# Patient Record
Sex: FEMALE | Race: BLACK OR AFRICAN AMERICAN | Employment: FULL TIME | URBAN - METROPOLITAN AREA
[De-identification: names, ages, dates, MRNs, and addresses within clinical notes are randomized per-mention and may not be internally consistent; named-entity substitution may affect disease eponyms.]

---

## 2021-03-05 ENCOUNTER — HOSPITAL ENCOUNTER (OUTPATIENT)
Dept: PREADMISSION TESTING | Age: 36
Discharge: HOME | End: 2021-03-05
Payer: COMMERCIAL

## 2021-03-05 ENCOUNTER — ANESTHESIA EVENT (OUTPATIENT)
Dept: SURGERY | Age: 36
End: 2021-03-05
Payer: COMMERCIAL

## 2021-03-05 PROCEDURE — U0003 INFECTIOUS AGENT DETECTION BY NUCLEIC ACID (DNA OR RNA); SEVERE ACUTE RESPIRATORY SYNDROME CORONAVIRUS 2 (SARS-COV-2) (CORONAVIRUS DISEASE [COVID-19]), AMPLIFIED PROBE TECHNIQUE, MAKING USE OF HIGH THROUGHPUT TECHNOLOGIES AS DESCRIBED BY CMS-2020-01-R: HCPCS

## 2021-03-06 LAB — SARS-COV-2, COV2NT: NOT DETECTED

## 2021-03-08 ENCOUNTER — HOSPITAL ENCOUNTER (OUTPATIENT)
Age: 36
Discharge: HOME | End: 2021-03-08
Attending: STUDENT IN AN ORGANIZED HEALTH CARE EDUCATION/TRAINING PROGRAM | Admitting: STUDENT IN AN ORGANIZED HEALTH CARE EDUCATION/TRAINING PROGRAM
Payer: COMMERCIAL

## 2021-03-08 ENCOUNTER — ANESTHESIA (OUTPATIENT)
Dept: SURGERY | Age: 36
End: 2021-03-08
Payer: COMMERCIAL

## 2021-03-08 VITALS
TEMPERATURE: 98.2 F | BODY MASS INDEX: 36.52 KG/M2 | HEART RATE: 64 BPM | OXYGEN SATURATION: 100 % | HEIGHT: 70 IN | DIASTOLIC BLOOD PRESSURE: 71 MMHG | SYSTOLIC BLOOD PRESSURE: 129 MMHG | WEIGHT: 255.07 LBS | RESPIRATION RATE: 17 BRPM

## 2021-03-08 PROBLEM — O03.4 INCOMPLETE ABORTION: Status: ACTIVE | Noted: 2021-03-08

## 2021-03-08 PROCEDURE — 76210000046 HC AMBSU PH II REC FIRST 0.5 HR: Performed by: STUDENT IN AN ORGANIZED HEALTH CARE EDUCATION/TRAINING PROGRAM

## 2021-03-08 PROCEDURE — 76060000061 HC AMB SURG ANES 0.5 TO 1 HR: Performed by: STUDENT IN AN ORGANIZED HEALTH CARE EDUCATION/TRAINING PROGRAM

## 2021-03-08 PROCEDURE — 77030011210: Performed by: STUDENT IN AN ORGANIZED HEALTH CARE EDUCATION/TRAINING PROGRAM

## 2021-03-08 PROCEDURE — 76210000034 HC AMBSU PH I REC 0.5 TO 1 HR: Performed by: STUDENT IN AN ORGANIZED HEALTH CARE EDUCATION/TRAINING PROGRAM

## 2021-03-08 PROCEDURE — 77030019905 HC CATH URETH INTMIT MDII -A: Performed by: STUDENT IN AN ORGANIZED HEALTH CARE EDUCATION/TRAINING PROGRAM

## 2021-03-08 PROCEDURE — 88305 TISSUE EXAM BY PATHOLOGIST: CPT

## 2021-03-08 PROCEDURE — 74011000250 HC RX REV CODE- 250: Performed by: STUDENT IN AN ORGANIZED HEALTH CARE EDUCATION/TRAINING PROGRAM

## 2021-03-08 PROCEDURE — 76030000000 HC AMB SURG OR TIME 0.5 TO 1: Performed by: STUDENT IN AN ORGANIZED HEALTH CARE EDUCATION/TRAINING PROGRAM

## 2021-03-08 PROCEDURE — 77030003666 HC NDL SPINAL BD -A: Performed by: STUDENT IN AN ORGANIZED HEALTH CARE EDUCATION/TRAINING PROGRAM

## 2021-03-08 PROCEDURE — 74011250636 HC RX REV CODE- 250/636: Performed by: NURSE ANESTHETIST, CERTIFIED REGISTERED

## 2021-03-08 PROCEDURE — 77030008577 HC TBNG UTER SUC OCOA -A: Performed by: STUDENT IN AN ORGANIZED HEALTH CARE EDUCATION/TRAINING PROGRAM

## 2021-03-08 PROCEDURE — 74011000250 HC RX REV CODE- 250: Performed by: NURSE ANESTHETIST, CERTIFIED REGISTERED

## 2021-03-08 PROCEDURE — 77030020143 HC AIRWY LARYN INTUB CGAS -A: Performed by: ANESTHESIOLOGY

## 2021-03-08 PROCEDURE — 74011250636 HC RX REV CODE- 250/636: Performed by: ANESTHESIOLOGY

## 2021-03-08 PROCEDURE — 77030008578 HC TBNG UTER SUC BUSS -A: Performed by: STUDENT IN AN ORGANIZED HEALTH CARE EDUCATION/TRAINING PROGRAM

## 2021-03-08 PROCEDURE — 2709999900 HC NON-CHARGEABLE SUPPLY: Performed by: STUDENT IN AN ORGANIZED HEALTH CARE EDUCATION/TRAINING PROGRAM

## 2021-03-08 RX ORDER — GLYCOPYRROLATE 0.2 MG/ML
INJECTION INTRAMUSCULAR; INTRAVENOUS AS NEEDED
Status: DISCONTINUED | OUTPATIENT
Start: 2021-03-08 | End: 2021-03-08 | Stop reason: HOSPADM

## 2021-03-08 RX ORDER — BUPIVACAINE HYDROCHLORIDE 2.5 MG/ML
INJECTION, SOLUTION EPIDURAL; INFILTRATION; INTRACAUDAL AS NEEDED
Status: DISCONTINUED | OUTPATIENT
Start: 2021-03-08 | End: 2021-03-08 | Stop reason: HOSPADM

## 2021-03-08 RX ORDER — IBUPROFEN 800 MG/1
800 TABLET ORAL
Qty: 40 TAB | Refills: 0 | Status: SHIPPED | OUTPATIENT
Start: 2021-03-08

## 2021-03-08 RX ORDER — SODIUM CHLORIDE, SODIUM LACTATE, POTASSIUM CHLORIDE, CALCIUM CHLORIDE 600; 310; 30; 20 MG/100ML; MG/100ML; MG/100ML; MG/100ML
125 INJECTION, SOLUTION INTRAVENOUS CONTINUOUS
Status: DISCONTINUED | OUTPATIENT
Start: 2021-03-08 | End: 2021-03-08 | Stop reason: HOSPADM

## 2021-03-08 RX ORDER — SODIUM CHLORIDE 0.9 % (FLUSH) 0.9 %
5-40 SYRINGE (ML) INJECTION AS NEEDED
Status: DISCONTINUED | OUTPATIENT
Start: 2021-03-08 | End: 2021-03-08 | Stop reason: HOSPADM

## 2021-03-08 RX ORDER — KETOROLAC TROMETHAMINE 30 MG/ML
INJECTION, SOLUTION INTRAMUSCULAR; INTRAVENOUS AS NEEDED
Status: DISCONTINUED | OUTPATIENT
Start: 2021-03-08 | End: 2021-03-08 | Stop reason: HOSPADM

## 2021-03-08 RX ORDER — FLUMAZENIL 0.1 MG/ML
0.2 INJECTION INTRAVENOUS
Status: DISCONTINUED | OUTPATIENT
Start: 2021-03-08 | End: 2021-03-08 | Stop reason: HOSPADM

## 2021-03-08 RX ORDER — LIDOCAINE HYDROCHLORIDE 20 MG/ML
INJECTION, SOLUTION EPIDURAL; INFILTRATION; INTRACAUDAL; PERINEURAL AS NEEDED
Status: DISCONTINUED | OUTPATIENT
Start: 2021-03-08 | End: 2021-03-08 | Stop reason: HOSPADM

## 2021-03-08 RX ORDER — LIDOCAINE HYDROCHLORIDE 10 MG/ML
0.1 INJECTION, SOLUTION EPIDURAL; INFILTRATION; INTRACAUDAL; PERINEURAL AS NEEDED
Status: DISCONTINUED | OUTPATIENT
Start: 2021-03-08 | End: 2021-03-08 | Stop reason: HOSPADM

## 2021-03-08 RX ORDER — NALOXONE HYDROCHLORIDE 0.4 MG/ML
0.2 INJECTION, SOLUTION INTRAMUSCULAR; INTRAVENOUS; SUBCUTANEOUS
Status: DISCONTINUED | OUTPATIENT
Start: 2021-03-08 | End: 2021-03-08 | Stop reason: HOSPADM

## 2021-03-08 RX ORDER — ONDANSETRON 2 MG/ML
INJECTION INTRAMUSCULAR; INTRAVENOUS AS NEEDED
Status: DISCONTINUED | OUTPATIENT
Start: 2021-03-08 | End: 2021-03-08 | Stop reason: HOSPADM

## 2021-03-08 RX ORDER — HYDROMORPHONE HYDROCHLORIDE 1 MG/ML
.25-1 INJECTION, SOLUTION INTRAMUSCULAR; INTRAVENOUS; SUBCUTANEOUS
Status: DISCONTINUED | OUTPATIENT
Start: 2021-03-08 | End: 2021-03-08 | Stop reason: HOSPADM

## 2021-03-08 RX ORDER — PROPOFOL 10 MG/ML
INJECTION, EMULSION INTRAVENOUS AS NEEDED
Status: DISCONTINUED | OUTPATIENT
Start: 2021-03-08 | End: 2021-03-08 | Stop reason: HOSPADM

## 2021-03-08 RX ORDER — DIPHENHYDRAMINE HYDROCHLORIDE 50 MG/ML
12.5 INJECTION, SOLUTION INTRAMUSCULAR; INTRAVENOUS AS NEEDED
Status: DISCONTINUED | OUTPATIENT
Start: 2021-03-08 | End: 2021-03-08 | Stop reason: HOSPADM

## 2021-03-08 RX ORDER — SODIUM CHLORIDE 0.9 % (FLUSH) 0.9 %
5-40 SYRINGE (ML) INJECTION EVERY 8 HOURS
Status: DISCONTINUED | OUTPATIENT
Start: 2021-03-08 | End: 2021-03-08 | Stop reason: HOSPADM

## 2021-03-08 RX ORDER — DEXAMETHASONE SODIUM PHOSPHATE 4 MG/ML
INJECTION, SOLUTION INTRA-ARTICULAR; INTRALESIONAL; INTRAMUSCULAR; INTRAVENOUS; SOFT TISSUE AS NEEDED
Status: DISCONTINUED | OUTPATIENT
Start: 2021-03-08 | End: 2021-03-08 | Stop reason: HOSPADM

## 2021-03-08 RX ORDER — FENTANYL CITRATE 50 UG/ML
INJECTION, SOLUTION INTRAMUSCULAR; INTRAVENOUS AS NEEDED
Status: DISCONTINUED | OUTPATIENT
Start: 2021-03-08 | End: 2021-03-08 | Stop reason: HOSPADM

## 2021-03-08 RX ORDER — MIDAZOLAM HYDROCHLORIDE 1 MG/ML
INJECTION, SOLUTION INTRAMUSCULAR; INTRAVENOUS AS NEEDED
Status: DISCONTINUED | OUTPATIENT
Start: 2021-03-08 | End: 2021-03-08 | Stop reason: HOSPADM

## 2021-03-08 RX ADMIN — KETOROLAC TROMETHAMINE 30 MG: 30 INJECTION INTRAMUSCULAR; INTRAVENOUS at 13:23

## 2021-03-08 RX ADMIN — DEXAMETHASONE SODIUM PHOSPHATE 4 MG: 4 INJECTION, SOLUTION INTRAMUSCULAR; INTRAVENOUS at 13:12

## 2021-03-08 RX ADMIN — MIDAZOLAM HYDROCHLORIDE 4 MG: 2 INJECTION, SOLUTION INTRAMUSCULAR; INTRAVENOUS at 12:48

## 2021-03-08 RX ADMIN — FAMOTIDINE 20 MG: 10 INJECTION, SOLUTION INTRAVENOUS at 12:26

## 2021-03-08 RX ADMIN — FENTANYL CITRATE 50 MCG: 0.05 INJECTION, SOLUTION INTRAMUSCULAR; INTRAVENOUS at 12:57

## 2021-03-08 RX ADMIN — SODIUM CHLORIDE, POTASSIUM CHLORIDE, SODIUM LACTATE AND CALCIUM CHLORIDE: 600; 310; 30; 20 INJECTION, SOLUTION INTRAVENOUS at 12:30

## 2021-03-08 RX ADMIN — ONDANSETRON HYDROCHLORIDE 4 MG: 2 SOLUTION INTRAMUSCULAR; INTRAVENOUS at 13:23

## 2021-03-08 RX ADMIN — GLYCOPYRROLATE 0.2 MG: 0.2 INJECTION INTRAMUSCULAR; INTRAVENOUS at 13:15

## 2021-03-08 RX ADMIN — FENTANYL CITRATE 50 MCG: 0.05 INJECTION, SOLUTION INTRAMUSCULAR; INTRAVENOUS at 13:05

## 2021-03-08 RX ADMIN — LIDOCAINE HYDROCHLORIDE 80 MG: 20 INJECTION, SOLUTION INTRAVENOUS at 13:00

## 2021-03-08 RX ADMIN — PROPOFOL 200 MG: 10 INJECTION, EMULSION INTRAVENOUS at 13:00

## 2021-03-08 NOTE — ANESTHESIA POSTPROCEDURE EVALUATION
Procedure(s):  DILATATION AND CURETTAGE WITH SUCTION. general    Anesthesia Post Evaluation      Multimodal analgesia: multimodal analgesia not used between 6 hours prior to anesthesia start to PACU discharge  Patient location during evaluation: PACU  Patient participation: complete - patient participated  Level of consciousness: sleepy but conscious and responsive to verbal stimuli  Pain score: 0  Pain management: adequate  Airway patency: patent  Anesthetic complications: no  Cardiovascular status: hemodynamically stable and acceptable  Respiratory status: acceptable  Hydration status: acceptable  Comments: Patient seen and evaluated; no concerns. Post anesthesia nausea and vomiting:  none      INITIAL Post-op Vital signs:   Vitals Value Taken Time   /78 03/08/21 1425   Temp 36.8 °C (98.2 °F) 03/08/21 1415   Pulse 75 03/08/21 1426   Resp 14 03/08/21 1426   SpO2 100 % 03/08/21 1426   Vitals shown include unvalidated device data.

## 2021-03-08 NOTE — PERIOP NOTES
Discharge instructions reviewed with Agustín zavala Senters. Significant other made aware RX for ibuprofen placed in discharge folder. Opportunity for questions provided and answered.

## 2021-03-08 NOTE — OP NOTES
Name: Henri Gordon  MR#: [id number]  : 1985    DATE OF SERVICE: 3/8/2021     PREOPERATIVE DIAGNOSIS:  A 37year-old  2, para 1 with incomplete      POSTOPERATIVE DIAGNOSES:  Same      PROCEDURE  PERFORMED:  Dilation and curettage with suction. SURGEON:  Tiffanie Eisenberg DO.     ASSISTANT:  None. ANESTHESIA:  General with LMA and paracervical block. ESTIMATED BLOOD LOSS:  100cc     URINE OUTPUT:  150cc     IV FLUIDS:  500 ml of crystalloid     SPECIMENS REMOVED:  Products of conception. FINDINGS:  A 9-10 week size anteverted uterus, cervix closed. Tissue and clot consistent with products of conception evacuated from the uterus. COMPLICATIONS:  None. INDICATIONS:  This is a 37year-old  1, para 2 who presented to the office for evaluation of bleeding in early pregnancy  Was found to have a pregnancy of unknown location, suspected incomplete . She was counseled on options, and ultimately decided upon dilation and curettage to manage her miscarriage. Her blood type is A pos     DESCRIPTION OF THE PROCEDURE IN DETAIL:  The patient was taken to the operating room, positioned on the operating room table in supine position. After adequate anesthesia was achieved via general anesthesia with an LMA, she had her legs positioned in the [address] [place] [address]. Her perineum was prepped and she was draped in a sterile fashion. After a time-out was performed, a sterile speculum was inserted into the vagina. The cervix was visualized and was grasped with a ring forceps. A paracervical block was performed using 0.25% Marcaine, a total of 20 mL was injected. The cervix was then dilated up to a 9mm used Hegar dilators. An 9mm curved suction curette was then inserted and the uterine contents evacuated x2 passes, followed by sharp curettage which was done gently and a gritty texture was noted throughout. One additional pass with suction was performed.   At this point, all instruments were removed from the uterus, cervix and vagina. The patient had her legs taken down out of stirrups, and she was awoken from anesthesia and taken to the recovery room in stable condition. All counts were correct at the end of the case.         Raheem Foss, DO

## 2021-03-08 NOTE — DISCHARGE INSTRUCTIONS
Discharge Instructions for outpatient GYN surgery    Patient ID:  Ronnie Cordova  [id number]  30 y.o.  1985    Take Home Medications     Follow-up with Dr Romi Killian in 2 weeks, call office for appointment, [phone]    Follow-up care is a key part of your treatment and safety. Be sure to schedule and keep all recommended follow up appointments    What to Expect at [facility] might feel a bit sleepy, groggy or nauseous today because of the anesthetic or pain medication you received. Do not drive today. These symptoms should resolve by tomorrow. You will probably feel some cramping over the next day or two- this is normal.  You may take an over-the-counter pain medication such as Tylenol, Aleve, Motrin, Advil (ibuprofen) or you may have been given a prescription pain medication. Try to limit use of prescription pain medication to just a day or two, as they can cause constipation. You will probably experience some light vaginal bleeding or spotting. This is normal.     How can you care for yourself at home? Activity  Pelvic rest for 2-4 weeks (nothing in your vagina such as tampons, intercourse or douching)  You man return to work and normal activities tomorrow or the next day. If you are feeling well, you can also resume exercise. If you are having pain or not feeling well, you should wait a few days before exercising. Diet  Regular diet as tolerated  Drink plenty of fluids    Medicines  Take pain medicines as directed by your doctor. If you a prescription for pain medicine, take as needed . If you are not taking a prescription pain medicine, take an over-the-counter medicine such as acetaminophen (Tylenol), ibuprofen (Advil, Motrin), or naproxen (Aleve). Read and follow all instructions on the label. Do not take two or more pain medicines at the same time unless the doctor told you to. Many pain medicines contain acetaminophen, which is Tylenol.  Too much Tylenol can be harmful. If your doctor prescribed antibiotics, take them as directed. Do not stop taking them just because you feel better. If you think your pain medicine is making your stomach upset:  Take your medicine after meals (unless your doctor tells you not to). Ask your doctor for a different pain medicine. Call your doctor  or seek immediate medical care if you have:  bright red vaginal bleeding that soaks one or more pads in an hour, or you have large clots. foul-smelling discharge from your vagina. persistent nausea and vomiting  pain that does not get better after you take pain medicine. signs of infection, such as: Increased pain, swelling, warmth, or redness. A persistent fever of 101.5 F  signs of a blood clot, such as:  Pain, redness or swelling in your lower extremeties  You have trouble passing urine or stool, especially if you have pain or swelling in your lower belly. hot flashes, sweating, flushing, or a fast or pounding heartbeat.:  no bowel movement after taking a laxative. loss of consciousness  sudden chest pain and shortness of breath, or you cough up blood. persistent abdominal pain despite taking pain medication        DISCHARGE SUMMARY from your Nurse      PATIENT INSTRUCTIONS    After general anesthesia or intravenous sedation, for 24 hours or while taking prescription Narcotics:  · Limit your activities  · Do not drive and operate hazardous machinery  · Do not make important personal or business decisions  · Do  not drink alcoholic beverages  · If you have not urinated within 8 hours after discharge, please contact your surgeon on call.     Report the following to your surgeon:  · Excessive pain, swelling, redness or odor of or around the surgical area  · Temperature over 100.5  · Nausea and vomiting lasting longer than 4 hours or if unable to take medications  · Any signs of decreased circulation or nerve impairment to extremity: change in color, persistent  numbness, tingling, coldness or increase pain  · Any questions      GOOD HELP TO FIGHT AN INFECTION  Here are a few tip to help reduce the chance of getting an infection after surgery:   Wash Your Hands   Good handwashing is the most important thing you and your caregiver can do.  Wash before and after caring for any wounds. Dry your hand with a clean towel.  Wash with soap and water for at least 20 seconds. A TIP: sing the \"Happy Birthday\" song through one time while washing to help with the timing.  Use a hand  in between washings.  Shower   When your surgeon says it is OK to take a shower, use a new bar of antibacterial soap (if that is what you use, and keep that bar of soap ONLY for your use), or antibacterial body wash.  Use a clean wash cloth or sponge when you bathe.  Dry off with a clean towel  after every bath - be careful around any wounds, skin staples, sutures or surgical glue over/on wounds.  Do not enter swimming pools, hot tubs, lakes, rivers and/or ocean until wounds are healed and your doctor/surgeon says it is OK.  Use Clean Sheets   Sleep on freshly laundered sheets after your surgery.  Keep the surgery site covered with a clean, dry bandage (if instructed to do so). If the bandage becomes soiled, reapply a new, dry, clean bandage.  Do not allow pets to sleep with you while your wound is healing.  Lifestyle Modification and Controlling Your Blood Sugar   Smoking slows wound healing. Stop smoking and limit exposure to second-hand smoke.  High blood sugar slows wound healing. Eat a well-balanced diet to provide proper nutrition while healing   Monitor your blood sugar (if you are a diabetic) and take your medications as you are suppose to so you can control you blood sugar after surgery. COUGH AND DEEP BREATHE    Breathing deeply and coughing are very important exercises to do after surgery.   Deep breathing and coughing open the little air tubes and air sacks in your lungs. You take deep breaths every day. You may not even notice - it is just something you do when you sigh or yawn. It is a natural exercise you do to keep these air passages open. After surgery, take deep breaths and cough, on purpose. DIRECTIONS:  · Take 10 to 15 slow deep breaths every hour while awake. · Breathe in deeply, and hold it for 2 seconds. · Exhale slowly through puckered lips, like blowing up a balloon. · After every 4th or 5th deep breath, hug your pillow to your chest or belly and give a hard, deep cough. Yes, it will probably hurt. But doing this exercise is a very important part of healing after surgery. Take your pain medicine to help you do this exercise without too much pain. Coughing and deep breathing help prevent bronchitis and pneumonia after surgery. If you had chest or belly surgery, use a pillow as a \"hug ana\" and hold it tightly to your chest or belly when you cough. ANKLE PUMPS    Ankle pumps increase the circulation of oxygenated blood to your lower extremities and decrease your risk for circulation problems such as blood clots. They also stretch the muscles, tendons and ligaments in your foot and ankle, and prevent joint contracture in the ankle and foot, especially after surgeries on the legs. It is important to do ankle pump exercises regularly after surgery because immobility increases your risk for developing a blood clot. Your doctor may also have you take an Aspirin for the next few days as well. If your doctor did not ask you to take an Aspirin, consult with him before starting Aspirin therapy on your own. The exercise is quite simple. · Slowly point your foot forward, feeling the muscles on the top of your lower leg stretch, and hold this position for 5 seconds. · Next, pull your foot back toward you as far as possible, stretching the calf muscles, and hold that position for 5 seconds. · Repeat with the other foot. · Perform 10 repetitions every hour while awake for both ankles if possible (down and then up with the foot once is one repetition). You should feel gentle stretching of the muscles in your lower leg when doing this exercise. If you feel pain, or your range of motion is limited, don't push too hard. Only go the limit your joint and muscles will let you go. If you have increasing pain, progressively worsening leg warmth or swelling, STOP the exercise and call your doctor. MEDICATION AND   SIDE EFFECT GUIDE    The [facility] MEDICATION AND SIDE EFFECT GUIDE was provided to the PATIENT AND CARE PROVIDER. Information provided includes instruction about drug purpose and common side effects for the following medications:   · Ibuprofen        These are general instructions for a healthy lifestyle:    *   Please give a list of your current medications to your Primary Care Provider. *   Please update this list whenever your medications are discontinued, doses are changed, or new medications (including over-the-counter products) are added. *   Please carry medication information at all times in case of emergency situations. About Smoking  No smoking / No tobacco products  Avoid exposure to second hand smoke     Surgeon General's Warning:  Quitting smoking now greatly reduces serious risk to your health. Obesity, smoking, and sedentary lifestyle greatly increases your risk for illness and disease. A healthy diet, regular physical exercise & weight monitoring are important for maintaining a healthy lifestyle. Congestive Heart Failure  You may be retaining fluid if you have a history of heart failure or if you experience any of the following symptoms:  Weight gain of 3 pounds or more overnight or 5 pounds in a week, increased swelling in your hands or feet or shortness of breath while lying flat in bed.   Please call your doctor as soon as you notice any of these symptoms; do not wait until your next office visit. Recognize signs and symptoms of STROKE:  F -  Face looks uneven  A -  Arms unable to move or move evenly  S -  Speech slurred or non-existent  T -  Time-call 911 as soon as signs and symptoms begin-DO NOT go          back to bed or wait to see if you get better-TIME IS BRAIN. Warning Signs of HEART ATTACK   Call 911 if you have these symptoms:     Chest discomfort. Most heart attacks involve discomfort in the center of the chest that lasts more than a few minutes, or that goes away and comes back. It can feel like uncomfortable pressure, squeezing, fullness, or pain.  Discomfort in other areas of the upper body. Symptoms can include pain or discomfort in one or both arms, the back, neck, jaw, or stomach.  Shortness of breath with or without chest discomfort.  Other signs may include breaking out in a cold sweat, nausea, or lightheadedness. Don't wait more than five minutes to call 911 - MINUTES MATTER! Fast action can save your life. Calling 911 is almost always the fastest way to get lifesaving treatment. Emergency Medical Services staff can begin treatment when they arrive -- up to an hour sooner than if someone gets to the hospital by car. Learning About Coronavirus [phone])  Coronavirus [phone]): Overview  What is coronavirus (COVID-19)? The coronavirus disease (COVID-19) is caused by a virus. It is an illness that was first found in [place], [place], in December 2019. It has since spread worldwide. The virus can cause fever, cough, and trouble breathing. In severe cases, it can cause pneumonia and make it hard to breathe without help. It can cause death. Coronaviruses are a large group of viruses. They cause the common cold. They also cause more serious illnesses like Middle East respiratory syndrome (MERS) and severe acute respiratory syndrome (SARS). COVID-19 is caused by a novel coronavirus.  That means it's a new type that has not been seen in people before. This virus spreads person-to-person through droplets from coughing and sneezing. It can also spread when you are close to someone who is infected. And it can spread when you touch something that has the virus on it, such as a doorknob or a tabletop. What can you do to protect yourself from coronavirus (COVID-19)? The best way to protect yourself from getting sick is to:  · Avoid areas where there is an outbreak. · Avoid contact with people who may be infected. · Wash your hands often with soap or alcohol-based hand sanitizers. · Avoid crowds and try to stay at least 6 feet away from other people. · Wash your hands often, especially after you cough or sneeze. Use soap and water, and scrub for at least 20 seconds. If soap and water aren't available, use an alcohol-based hand . · Avoid touching your mouth, nose, and eyes. What can you do to avoid spreading the virus to others? To help avoid spreading the virus to others:  · Cover your mouth with a tissue when you cough or sneeze. Then throw the tissue in the trash. · Use a disinfectant to clean things that you touch often. · Stay home if you are sick or have been exposed to the virus. Don't go to school, work, or public areas. And don't use public transportation. · If you are sick:  ? Leave your home only if you need to get medical care. But call the doctor's office first so they know you're coming. And wear a face mask, if you have one.  ? If you have a face mask, wear it whenever you're around other people. It can help stop the spread of the virus when you cough or sneeze. ? Clean and disinfect your home every day. Use household  and disinfectant wipes or sprays. Take special care to clean things that you grab with your hands. These include doorknobs, remote controls, phones, and handles on your refrigerator and microwave.  And don't forget countertops, tabletops, bathrooms, and computer keyboards. When to call for help  Call 911 anytime you think you may need emergency care. For example, call if:  · You have severe trouble breathing. (You can't talk at all.)  · You have constant chest pain or pressure. · You are severely dizzy or lightheaded. · You are confused or can't think clearly. · Your face and lips have a blue color. · You pass out (lose consciousness) or are very hard to wake up. Call your doctor now if you develop symptoms such as:  · Shortness of breath. · Fever. · Cough. If you need to get care, call ahead to the doctor's office for instructions before you go. Make sure you wear a face mask, if you have one, to prevent exposing other people to the virus. Where can you get the latest information? The following health organizations are tracking and studying this virus. Their websites contain the most up-to-date information. Asad Ao also learn what to do if you think you may have been exposed to the virus. · U.S. Centers for Disease Control and Prevention (CDC): The CDC provides updated news about the disease and travel advice. The website also tells you how to prevent the spread of infection. www.cdc.gov  · World Health Organization [facility]): WHO offers information about the virus outbreaks. WHO also has travel advice. www.who.int  Current as of: April 1, 2020               Content Version: 12.4  © 5770-3907 [employer]. Care instructions adapted under license by your healthcare professional. If you have questions about a medical condition or this instruction, always ask your healthcare professional. [place] [address] any warranty or liability for your use of this information. The discharge information has been reviewed with the boyfriend. Any questions and concerns from the boyfriend have been addressed. The boyfriend verbalized understanding.         CONTENTS FOUND IN YOUR DISCHARGE ENVELOPE:  [x]     Surgeon and Hospital Discharge Instructions  [x]     [facility] Care Provider Card  [x]     Medication & Side Effect Guide            (your newly prescribed medications have been marked/highlighted showing the most common side effects from the classes of drugs on your prescriptions)  [x]     Medication Prescription(s) x Ibuprofen (RX given at time of discharge) ( [] These have been sent electronically to your pharmacy by your surgeon,   - OR -       your surgeon has already provided these to you during a previous/pre-op office visit)  []     Physical Therapy Prescription  []     Follow-up Appointment Cards  []     Surgery-related Pictures/Media  []     Pain block and/or block with On-Q Catheter from Anesthesia Service (information included in your instructions above)  []     Medical device information sheets/pamphlets from their    []     School/work excuse note. []     /parent work excuse note. The following personal items collected during your admission are returned to you:   Dental Appliance: Dental Appliances: Partials  Vision:    Hearing Aid:    Jewelry: Jewelry: Other (comment)(removed in waiting room placed in purse by patient, )  Clothing: Clothing: Shirt, Socks, Footwear, Pants, Undergarments  Other Valuables:  Other Valuables: Cell Phone  Valuables sent to safe: Personal Items Sent to Safe: n/a

## 2021-03-08 NOTE — ANESTHESIA PREPROCEDURE EVALUATION
Anesthetic History   No history of anesthetic complications            Review of Systems / Medical History  Patient summary reviewed and pertinent labs reviewed    Pulmonary  Within defined limits                 Neuro/Psych   Within defined limits           Cardiovascular  Within defined limits                     GI/Hepatic/Renal  Within defined limits              Endo/Other        Obesity     Other Findings   Comments: Miscarriage, approx 7 weeks EGA           Physical Exam    Airway  Mallampati: II    Neck ROM: normal range of motion   Mouth opening: Normal     Cardiovascular    Rhythm: regular  Rate: normal         Dental    Dentition: Lower dentition intact and Upper dentition intact     Pulmonary  Breath sounds clear to auscultation               Abdominal  GI exam deferred       Other Findings            Anesthetic Plan    ASA: 2  Anesthesia type: general  LMA  preop Pepcid        Induction: Intravenous  Anesthetic plan and risks discussed with: Patient

## 2021-03-08 NOTE — H&P
Day of surgery H&P Update     Pt seen and examined. No interval changes. Please see paper H&P from the office on 3/3/21. Diagnosis is Incomplete . Planned procedure is Suction D&C. Consents reviewed and signed and all questions answered. SCDs for DVT PPx. ABX PPx Doxy po pt took before and will take after at home. Proceed to OR.      James Dolan,

## 2022-03-19 PROBLEM — O03.4 INCOMPLETE ABORTION: Status: ACTIVE | Noted: 2021-03-08

## 2023-05-23 ENCOUNTER — HOSPITAL ENCOUNTER (OUTPATIENT)
Facility: HOSPITAL | Age: 38
Setting detail: RECURRING SERIES
Discharge: HOME | End: 2023-05-26
Payer: COMMERCIAL

## 2023-05-23 PROCEDURE — 97161 PT EVAL LOW COMPLEX 20 MIN: CPT

## 2023-05-23 NOTE — THERAPY DISCHARGE
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] Treatment Diagnosis:  M54.59  OTHER LOWER BACK PAIN , and M53.3    SACROCOCCYGEAL DISORDERS, NOT ELSEWHERE CLASSIFIED    Referral Source:  HALEY Andino NP Provider #:  [id number]   Insurance: Payor: Mireya Pichardo / Plan: Vianca Newell / Product Type: *No Product type* /      Visit #   Current  / Total 1 10   Time   In / Out 1320 1407   Total Treatment Time 47 minutes   Total Timed Codes 0 minutes   [x] Patient's date of birth verified      SUBJECTIVE  Pain Level (0-10 scale): 1/10  Changes in pain since onset: Constant    Any medication changes, allergies to medications, adverse drug reactions, diagnosis change, or new procedure performed?: [x] No    [] Yes (see summary sheet for update)  Medications: Verified on Patient Summary List    Subjective functional status/changes:     Patient is 30 y.o. -American female who presents for physical therapy evaluation with complaints of lower lumbar/sacral time pain since 2023. Pt reports pain increases when she is sitting for about 15 minutes or more on soft chair, or less if a hard chair. Pt describes pain as aching pain when sitting, but when she completes sit to stand transfer she has a terrible pain, that is alleviated upon standing. MRI not approved until she completes physical therapy.       Onset Date: 2023  Start of Care: 2023  PLOF: Independent with all ADL's, Prepares full meals independently, Grocery shops independently, Drives independently, Dresses independently, and Bathes independently   Mechanism of Injury: Insidious onset since 2023  Previous

## 2023-06-01 ENCOUNTER — HOSPITAL ENCOUNTER (OUTPATIENT)
Facility: HOSPITAL | Age: 38
Setting detail: RECURRING SERIES
Discharge: HOME | End: 2023-06-04
Payer: COMMERCIAL

## 2023-06-01 PROCEDURE — G0283 ELEC STIM OTHER THAN WOUND: HCPCS

## 2023-06-01 PROCEDURE — 97110 THERAPEUTIC EXERCISES: CPT

## 2023-06-06 ENCOUNTER — HOSPITAL ENCOUNTER (OUTPATIENT)
Facility: HOSPITAL | Age: 38
Setting detail: RECURRING SERIES
Discharge: HOME | End: 2023-06-09
Payer: COMMERCIAL

## 2023-06-06 PROCEDURE — G0283 ELEC STIM OTHER THAN WOUND: HCPCS

## 2023-06-06 PROCEDURE — 97110 THERAPEUTIC EXERCISES: CPT

## 2023-06-06 NOTE — PROGRESS NOTES
minutes without pain greater than or equal to 4/10 to increase functional mobility. Status at last Eval/Progress Note: 6/10  Current Status: Pt reports that she hasn't kept track of how long she sits at a time and is constantly up and down  Goal Met? No     3. Patient will be able to complete sit to stand without pain greater than or equal to 4/10 to increase functional mobility. Status at last Eval/Progress Note: 6/10  Current Status: Some pain with transition that goes away  Goal Met? No        Long Term Goals, to be met within 8 treatments:  1. Patient will be able to sit in chair for 15 minutes without pain greater than or equal to 1/10 to increase functional mobility. Status at last Eval/Progress Note: 6/10  Current Status: Pt reports that she hasn't kept track of how long she sits at a time and is constantly up and down  Goal Met? No     2. Patient will be able to complete sit to stand without pain greater than or equal to 1/10 to increase functional mobility. Status at last Eval/Progress Note: 6/10  Current Status: Some pain with transition that goes away  Goal Met?   No    []  See Progress Note/Recertification  []  See Discharge Summary    PLAN  [x]  Continue plan of care  [x]  Upgrade activities as tolerated  []  Discharge due to :  []  Other:      RILEY Carrillo       6/6/2023       3:40 PM

## 2023-06-15 ENCOUNTER — HOSPITAL ENCOUNTER (OUTPATIENT)
Facility: HOSPITAL | Age: 38
Setting detail: RECURRING SERIES
Discharge: HOME | End: 2023-06-18
Payer: COMMERCIAL

## 2023-06-15 PROCEDURE — 97110 THERAPEUTIC EXERCISES: CPT

## 2023-06-15 PROCEDURE — G0283 ELEC STIM OTHER THAN WOUND: HCPCS

## 2023-06-21 ENCOUNTER — APPOINTMENT (OUTPATIENT)
Facility: HOSPITAL | Age: 38
End: 2023-06-21
Payer: COMMERCIAL

## 2023-06-21 ENCOUNTER — HOSPITAL ENCOUNTER (OUTPATIENT)
Facility: HOSPITAL | Age: 38
Setting detail: RECURRING SERIES
Discharge: HOME | End: 2023-06-24
Payer: COMMERCIAL

## 2023-06-21 PROCEDURE — G0283 ELEC STIM OTHER THAN WOUND: HCPCS

## 2023-06-21 PROCEDURE — 97110 THERAPEUTIC EXERCISES: CPT

## 2023-06-21 NOTE — PROGRESS NOTES
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] Treatment Diagnosis:  M54.59  OTHER LOWER BACK PAIN    Referral Source:  HALEY Marmolejo NP Insurance:   Payor: Leora Cabrera / Plan: Ana Paula De La Rosa / Product Type: *No Product type* /     [x] Patient's date of birth verified                Visit #   Current  / Total 7 10   Time   In / Out 8:39 9:52   Total Treatment Time (in minutes) 73    Total Timed Codes  (in minutes) 62    [address] [place] Totals Reminder:    8-22 min = 1 unit; 23-37 min = 2 units; 38-52 min = 3 units;  53-67 min = 4 units; 68-82 min = 5 units      SUBJECTIVE  Pain Level (0-10 scale): 0/10    Any medication changes, allergies to medications, adverse drug reactions, diagnosis change, or new procedure performed?: [x] No    [] Yes (see summary sheet for update)  Medications: [x]  Verified on Patient Summary List    Subjective functional status/changes:     \"I'm not hurting. I am having more days with less pain. \"Patient reports she started going to the gym this week and has been twice this week, working on eliptical , treadmill, weights and doing cardio moves. OBJECTIVE  Therapeutic Procedures: Tx Min Billable or 1:1 Min (if diff from Tx Min) Procedure, Rationale, Specifics   [phone] Therapeutic Exercise (timed):  increase ROM, strength, coordination, balance, and proprioception to improve patient's ability to progress to PLOF and address remaining functional goals. (see flow sheet as applicable)                  58 58    Total Total     Modalities Rationale:     decrease edema, decrease inflammation, decrease pain, and increase tissue extensibility to improve patient's ability to progress to PLOF and address remaining functional goals.     15   min [x] Estim Unattended,             []  NMES  [] PreMod       [x]  IFC  [] Other:  []w/US

## 2023-06-23 ENCOUNTER — HOSPITAL ENCOUNTER (OUTPATIENT)
Facility: HOSPITAL | Age: 38
Setting detail: RECURRING SERIES
Discharge: HOME | End: 2023-06-26
Payer: COMMERCIAL

## 2023-06-23 PROCEDURE — 97110 THERAPEUTIC EXERCISES: CPT

## 2023-06-26 ENCOUNTER — HOSPITAL ENCOUNTER (OUTPATIENT)
Facility: HOSPITAL | Age: 38
Setting detail: RECURRING SERIES
Discharge: HOME | End: 2023-06-29
Payer: COMMERCIAL

## 2023-06-26 PROCEDURE — 97110 THERAPEUTIC EXERCISES: CPT

## 2023-06-28 ENCOUNTER — APPOINTMENT (OUTPATIENT)
Facility: HOSPITAL | Age: 38
End: 2023-06-28
Payer: COMMERCIAL

## 2023-06-28 ENCOUNTER — HOSPITAL ENCOUNTER (OUTPATIENT)
Facility: HOSPITAL | Age: 38
Setting detail: RECURRING SERIES
Discharge: HOME | End: 2023-07-01
Payer: COMMERCIAL

## 2023-06-28 PROCEDURE — G0283 ELEC STIM OTHER THAN WOUND: HCPCS

## 2023-06-28 PROCEDURE — 97110 THERAPEUTIC EXERCISES: CPT

## 2023-08-24 LAB
ABO, EXTERNAL RESULT: NORMAL
C. TRACHOMATIS, EXTERNAL RESULT: NEGATIVE
HEP B, EXTERNAL RESULT: NEGATIVE
HIV, EXTERNAL RESULT: NEGATIVE
N. GONORRHOEAE, EXTERNAL RESULT: NEGATIVE
RH FACTOR, EXTERNAL RESULT: POSITIVE
RPR, EXTERNAL RESULT: NORMAL
RUBELLA TITER, EXTERNAL RESULT: NORMAL

## 2023-08-29 ENCOUNTER — APPOINTMENT (OUTPATIENT)
Facility: HOSPITAL | Age: 38
End: 2023-08-29
Payer: COMMERCIAL

## 2023-08-29 ENCOUNTER — HOSPITAL ENCOUNTER (EMERGENCY)
Facility: HOSPITAL | Age: 38
Discharge: HOME | End: 2023-08-29
Attending: EMERGENCY MEDICINE
Payer: COMMERCIAL

## 2023-08-29 VITALS
OXYGEN SATURATION: 99 % | WEIGHT: 252 LBS | HEIGHT: 70 IN | HEART RATE: 77 BPM | TEMPERATURE: 98.8 F | RESPIRATION RATE: 18 BRPM | SYSTOLIC BLOOD PRESSURE: 137 MMHG | BODY MASS INDEX: 36.08 KG/M2 | DIASTOLIC BLOOD PRESSURE: 79 MMHG
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
## 2023-08-29 DIAGNOSIS — R10.9 ABDOMINAL PAIN DURING PREGNANCY IN FIRST TRIMESTER: ICD-10-CM

## 2023-08-29 DIAGNOSIS — O26.891 ABDOMINAL PAIN DURING PREGNANCY IN FIRST TRIMESTER: ICD-10-CM

## 2023-08-29 LAB
ABO + RH BLD: NORMAL
ALBUMIN SERPL-MCNC: 3.4 G/DL (ref 3.5–5)
ALBUMIN/GLOB SERPL: 0.9 (ref 1.1–2.2)
ALP SERPL-CCNC: 64 U/L (ref 45–117)
ALT SERPL-CCNC: 26 U/L (ref 12–78)
ANION GAP SERPL CALC-SCNC: 7 MMOL/L (ref 5–15)
APPEARANCE UR: CLEAR
AST SERPL W P-5'-P-CCNC: 10 U/L (ref 15–37)
BACTERIA URNS QL MICRO: ABNORMAL /HPF
BASOPHILS # BLD: 0.1 K/UL (ref 0–0.1)
BASOPHILS NFR BLD: 1 % (ref 0–1)
BILIRUB SERPL-MCNC: 0.3 MG/DL (ref 0.2–1)
BILIRUB UR QL: NEGATIVE
BUN SERPL-MCNC: 7 MG/DL (ref 6–20)
BUN/CREAT SERPL: 9 (ref 12–20)
CA-I BLD-MCNC: 9.2 MG/DL (ref 8.5–10.1)
CHLORIDE SERPL-SCNC: 108 MMOL/L (ref 97–108)
CO2 SERPL-SCNC: 23 MMOL/L (ref 21–32)
COLOR UR: ABNORMAL
CREAT SERPL-MCNC: 0.74 MG/DL (ref 0.55–1.02)
DIFFERENTIAL METHOD BLD: ABNORMAL
EKG ATRIAL RATE: 92 BPM
EKG DIAGNOSIS: NORMAL
EKG P AXIS: 50 DEGREES
EKG P-R INTERVAL: 162 MS
EKG Q-T INTERVAL: 354 MS
EKG QRS DURATION: 80 MS
EKG QTC CALCULATION (BAZETT): 437 MS
EKG R AXIS: -6 DEGREES
EKG T AXIS: 10 DEGREES
EKG VENTRICULAR RATE: 92 BPM
EOSINOPHIL # BLD: 0.1 K/UL (ref 0–0.4)
EOSINOPHIL NFR BLD: 1 % (ref 0–7)
EPITH CASTS URNS QL MICRO: ABNORMAL /LPF
ERYTHROCYTE [DISTWIDTH] IN BLOOD BY AUTOMATED COUNT: 13.3 % (ref 11.5–14.5)
GLOBULIN SER CALC-MCNC: 3.6 G/DL (ref 2–4)
GLUCOSE SERPL-MCNC: 89 MG/DL (ref 65–100)
GLUCOSE UR STRIP.AUTO-MCNC: NEGATIVE MG/DL
HCG SERPL-ACNC: ABNORMAL MIU/ML (ref 0–6)
HCT VFR BLD AUTO: 35.6 % (ref 35–47)
HGB BLD-MCNC: 12.3 G/DL (ref 11.5–16)
HGB UR QL STRIP: NEGATIVE
IMM GRANULOCYTES # BLD AUTO: 0.1 K/UL (ref 0–0.04)
IMM GRANULOCYTES NFR BLD AUTO: 1 % (ref 0–0.5)
KETONES UR QL STRIP.AUTO: NEGATIVE MG/DL
LEUKOCYTE ESTERASE UR QL STRIP.AUTO: NEGATIVE
LYMPHOCYTES # BLD: 1.8 K/UL (ref 0.8–3.5)
LYMPHOCYTES NFR BLD: 14 % (ref 12–49)
MCH RBC QN AUTO: 30.6 PG (ref 26–34)
MCHC RBC AUTO-ENTMCNC: 34.6 G/DL (ref 30–36.5)
MCV RBC AUTO: 88.6 FL (ref 80–99)
MONOCYTES # BLD: 1 K/UL (ref 0–1)
MONOCYTES NFR BLD: 8 % (ref 5–13)
MUCOUS THREADS URNS QL MICRO: ABNORMAL /LPF
NEUTS SEG # BLD: 10.1 K/UL (ref 1.8–8)
NEUTS SEG NFR BLD: 75 % (ref 32–75)
NITRITE UR QL STRIP.AUTO: POSITIVE
NRBC # BLD: 0 K/UL (ref 0–0.01)
NRBC BLD-RTO: 0 PER 100 WBC
PH UR STRIP: 5 (ref 5–8)
PLATELET # BLD AUTO: 197 K/UL (ref 150–400)
POTASSIUM SERPL-SCNC: 3.7 MMOL/L (ref 3.5–5.1)
PROT SERPL-MCNC: 7 G/DL (ref 6.4–8.2)
PROT UR STRIP-MCNC: NEGATIVE MG/DL
RBC # BLD AUTO: 4.02 M/UL (ref 3.8–5.2)
RBC #/AREA URNS HPF: ABNORMAL /HPF (ref 0–5)
SODIUM SERPL-SCNC: 138 MMOL/L (ref 136–145)
SP GR UR REFRACTOMETRY: 1.01 (ref 1–1.03)
TROPONIN I SERPL HS-MCNC: 7 NG/L (ref 0–51)
UROBILINOGEN UR QL STRIP.AUTO: 0.1 EU/DL (ref 0.1–1)
WBC # BLD AUTO: 13.1 K/UL (ref 3.6–11)
WBC URNS QL MICRO: ABNORMAL /HPF (ref 0–4)

## 2023-08-29 PROCEDURE — 86901 BLOOD TYPING SEROLOGIC RH(D): CPT

## 2023-08-29 PROCEDURE — 80053 COMPREHEN METABOLIC PANEL: CPT

## 2023-08-29 PROCEDURE — 81001 URINALYSIS AUTO W/SCOPE: CPT

## 2023-08-29 PROCEDURE — 76801 OB US < 14 WKS SINGLE FETUS: CPT

## 2023-08-29 PROCEDURE — 99285 EMERGENCY DEPT VISIT HI MDM: CPT

## 2023-08-29 PROCEDURE — 84484 ASSAY OF TROPONIN QUANT: CPT

## 2023-08-29 PROCEDURE — 84702 CHORIONIC GONADOTROPIN TEST: CPT

## 2023-08-29 PROCEDURE — 6370000000 HC RX 637 (ALT 250 FOR IP): Performed by: EMERGENCY MEDICINE

## 2023-08-29 PROCEDURE — 93005 ELECTROCARDIOGRAM TRACING: CPT | Performed by: EMERGENCY MEDICINE

## 2023-08-29 PROCEDURE — 85025 COMPLETE CBC W/AUTO DIFF WBC: CPT

## 2023-08-29 PROCEDURE — 86900 BLOOD TYPING SEROLOGIC ABO: CPT

## 2023-08-29 PROCEDURE — 36415 COLL VENOUS BLD VENIPUNCTURE: CPT

## 2023-08-29 PROCEDURE — 2580000003 HC RX 258: Performed by: EMERGENCY MEDICINE

## 2023-08-29 RX ORDER — ACETAMINOPHEN 500 MG
1000 TABLET ORAL
Status: COMPLETED | OUTPATIENT
Start: 2023-08-29 | End: 2023-08-29

## 2023-08-29 RX ORDER — 0.9 % SODIUM CHLORIDE 0.9 %
1000 INTRAVENOUS SOLUTION INTRAVENOUS ONCE
Status: COMPLETED | OUTPATIENT
Start: 2023-08-29 | End: 2023-08-29

## 2023-08-29 RX ADMIN — SODIUM CHLORIDE 1000 ML: 9 INJECTION, SOLUTION INTRAVENOUS at 08:40

## 2023-08-29 RX ADMIN — ACETAMINOPHEN 1000 MG: 500 TABLET ORAL at 08:39

## 2023-08-29 ASSESSMENT — LIFESTYLE VARIABLES
HOW MANY STANDARD DRINKS CONTAINING ALCOHOL DO YOU HAVE ON A TYPICAL DAY: PATIENT DOES NOT DRINK
HOW OFTEN DO YOU HAVE A DRINK CONTAINING ALCOHOL: NEVER

## 2023-08-29 ASSESSMENT — PAIN DESCRIPTION - LOCATION: LOCATION: ABDOMEN;CHEST

## 2023-08-29 ASSESSMENT — PAIN - FUNCTIONAL ASSESSMENT: PAIN_FUNCTIONAL_ASSESSMENT: 0-10

## 2023-08-29 ASSESSMENT — PAIN SCALES - GENERAL: PAINLEVEL_OUTOF10: 4
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
## 2023-08-29 NOTE — DISCHARGE INSTRUCTIONS
Thank you! Thank you for allowing me to care for you in the emergency department. It is my goal to provide you with excellent care. If you have not received excellent quality care, please ask to speak to the nurse manager. Please fill out the survey that will come to you by mail or email since we listen to your feedback! Below you will find a list of your tests from today's visit. Should you have any questions, please do not hesitate to call the emergency department.     Labs  Recent Results (from the past 12 hour(s))   CBC with Auto Differential    Collection Time: 08/29/23  8:39 AM   Result Value Ref Range    WBC 13.1 (H) 3.6 - 11.0 K/uL    RBC 4.02 3.80 - 5.20 M/uL    Hemoglobin 12.3 11.5 - 16.0 g/dL    Hematocrit 35.6 35.0 - 47.0 %    MCV 88.6 80.0 - 99.0 FL    MCH 30.6 26.0 - 34.0 PG    MCHC 34.6 30.0 - 36.5 g/dL    RDW 13.3 11.5 - 14.5 %    Platelets 816 976 - 041 K/uL    Nucleated RBCs 0.0 0.0  WBC    nRBC 0.00 0.00 - 0.01 K/uL    Neutrophils % 75 32 - 75 %    Lymphocytes % 14 12 - 49 %    Monocytes % 8 5 - 13 %    Eosinophils % 1 0 - 7 %    Basophils % 1 0 - 1 %    Immature Granulocytes 1 (H) 0 - 0.5 %    Neutrophils Absolute 10.1 (H) 1.8 - 8.0 K/UL    Lymphocytes Absolute 1.8 0.8 - 3.5 K/UL    Monocytes Absolute 1.0 0.0 - 1.0 K/UL    Eosinophils Absolute 0.1 0.0 - 0.4 K/UL    Basophils Absolute 0.1 0.0 - 0.1 K/UL    Absolute Immature Granulocyte 0.1 (H) 0.00 - 0.04 K/UL    Differential Type AUTOMATED     CMP    Collection Time: 08/29/23  8:39 AM   Result Value Ref Range    Sodium 138 136 - 145 mmol/L    Potassium 3.7 3.5 - 5.1 mmol/L    Chloride 108 97 - 108 mmol/L    CO2 23 21 - 32 mmol/L    Anion Gap 7 5 - 15 mmol/L    Glucose 89 65 - 100 mg/dL    BUN 7 6 - 20 mg/dL    Creatinine 0.74 0.55 - 1.02 mg/dL    Bun/Cre Ratio 9 (L) 12 - 20      Est, Glom Filt Rate >60 >60 ml/min/1.73m2    Calcium 9.2 8.5 - 10.1 mg/dL    Total Bilirubin 0.3 0.2 - 1.0 mg/dL    AST 10 (L) 15 - 37 U/L    ALT 26 12 -
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
## 2023-12-28 ENCOUNTER — ROUTINE PRENATAL (OUTPATIENT)
Age: 38
End: 2023-12-28

## 2023-12-28 VITALS — HEART RATE: 83 BPM | DIASTOLIC BLOOD PRESSURE: 75 MMHG | SYSTOLIC BLOOD PRESSURE: 121 MMHG

## 2023-12-28 DIAGNOSIS — I10 HYPERTENSION, UNSPECIFIED TYPE: Primary | ICD-10-CM

## 2023-12-28 DIAGNOSIS — O09.522 ADVANCED MATERNAL AGE IN MULTIGRAVIDA, SECOND TRIMESTER: ICD-10-CM

## 2023-12-28 DIAGNOSIS — E66.9 OBESITY, CLASS II, BMI 35-39.9, NO COMORBIDITY: ICD-10-CM

## 2023-12-28 RX ORDER — LABETALOL 100 MG/1
100 TABLET, FILM COATED ORAL 2 TIMES DAILY
COMMUNITY
Start: 2023-12-23

## 2023-12-28 RX ORDER — ASPIRIN 81 MG/1
81 TABLET ORAL DAILY
COMMUNITY

## 2023-12-28 NOTE — PROCEDURES
PATIENT: JUAN FORTUNE   -  : 1985   -  DOS:2023   -  INTERPRETING PROVIDER:Cristofer Sheffield,   Indication  ========    Hypertension in pregnancy, Obesity in pregnancy, Advanced Maternal Age, low lying placenta    Method  ======    Transabdominal ultrasound examination. View: Suboptimal view: limited by late gestational age    Dating  ======    LMP on: 2023  GA by LMP 32 w + 4 d  JAIME by LMP: 3/24/2024  Previous Ultrasound on: 9/15/2023  Type of prior assessment: GA  GA at prior assessment date 12 w + 0 d  GA by previous U/S 26 w + 6 d  JAIME by previous Ultrasound: 3/29/2024  Ultrasound examination on: 2023  GA by U/S based upon: AC, BPD, Femur, HC  GA by U/S 26 w + 3 d  JAIME by U/S: 2024  Assigned: based on the LMP, selected on 2023  Assigned GA 27 w + 4 d  Assigned JAIME: 3/24/2024    Fetal Growth Overview  =================    Exam date        GA              BPD (mm)         HC (mm)            AC (mm)              FL (mm)             HL (mm)             EFW (g)  2023        27w 4d        63.2     2%        241.1    2%        229.9     35%        49.6    14%        47.1     49%        1000    17%    Fetal Biometry  ============    Standard  BPD 63.2 mm 25w 4d 2% Hadlock  OFD 86.8 mm 28w 0d 64% Christine  .1 mm 26w 1d 2% Hadlock  Cerebellum tr 30.6 mm 26w 3d 29% Hill  .9 mm 27w 2d 35% Hadlock  Femur 49.6 mm 26w 5d 14% Hadlock  Humerus 47.1 mm 27w 5d 49% Christine  EFW 1,000 g 26w 4d 17% Hadlock  EFW (lb) 2 lb  EFW (oz) 3 oz  EFW by: Hadlock (BPD-HC-AC-FL)  Extended   4.2 mm  CM 5.2 mm  13% Nicolaides  Head / Face / Neck  Nasal bone: present  Other Structures   bpm    General Evaluation  ==============    Cardiac activity present.  bpm. Fetal movements: visualized. Presentation: Breech  Placenta: Placental site: posterior, appropriate distance from the internal os. Placental edge-to-cervical os distance 2.8 cm  Umbilical cord: Cord vessels: 3 vessel cord.

## 2024-01-30 ENCOUNTER — ROUTINE PRENATAL (OUTPATIENT)
Age: 39
End: 2024-01-30
Payer: COMMERCIAL

## 2024-01-30 VITALS — SYSTOLIC BLOOD PRESSURE: 128 MMHG | DIASTOLIC BLOOD PRESSURE: 70 MMHG | HEART RATE: 93 BPM

## 2024-01-30 DIAGNOSIS — O10.919 CHRONIC HYPERTENSION IN PREGNANCY: ICD-10-CM

## 2024-01-30 DIAGNOSIS — O09.522 ADVANCED MATERNAL AGE IN MULTIGRAVIDA, SECOND TRIMESTER: Primary | ICD-10-CM

## 2024-01-30 DIAGNOSIS — Z03.72 ENCOUNTER FOR SUSPECTED PLACENTAL PROBLEM RULED OUT: ICD-10-CM

## 2024-01-30 DIAGNOSIS — E66.09 CLASS 2 OBESITY DUE TO EXCESS CALORIES WITHOUT SERIOUS COMORBIDITY WITH BODY MASS INDEX (BMI) OF 36.0 TO 36.9 IN ADULT: ICD-10-CM

## 2024-01-30 PROCEDURE — 99213 OFFICE O/P EST LOW 20 MIN: CPT | Performed by: OBSTETRICS & GYNECOLOGY

## 2024-01-30 PROCEDURE — 76816 OB US FOLLOW-UP PER FETUS: CPT | Performed by: OBSTETRICS & GYNECOLOGY

## 2024-01-30 RX ORDER — LABETALOL 200 MG/1
200 TABLET, FILM COATED ORAL 2 TIMES DAILY
COMMUNITY
Start: 2024-01-17

## 2024-01-30 NOTE — PROCEDURES
PATIENT: JUAN FORTUNE   -  : 1985   -  DOS:2024   -  INTERPRETING PROVIDER:Jessica Santo,   Indication  ========    Hypertension in pregnancy, Obesity in pregnancy, Advanced Maternal Age    Method  ======    Transabdominal ultrasound examination. View: Sufficient    Pregnancy  =========    Ly pregnancy. Number of fetuses: 1    Dating  ======    LMP on: 2023  GA by LMP 32 w + 2 d  JAIME by LMP: 3/24/2024  Previous Ultrasound on: 9/15/2023  Type of prior assessment: GA  GA at prior assessment date 12 w + 0 d  GA by previous U/S 31 w + 4 d  JAIME by previous Ultrasound: 3/29/2024  Ultrasound examination on: 2024  GA by U/S based upon: AC, BPD, Femur, HC  GA by U/S 31 w + 3 d  JAIME by U/S: 3/30/2024  Assigned: based on the LMP, selected on 2023  Assigned GA 32 w + 2 d  Assigned JAIME: 3/24/2024    Fetal Biometry  ============    Standard  BPD 78.0 mm 31w 2d 16% Hadlock  OFD 95.3 mm 30w 5d 14% Christine  .2 mm 30w 2d <1% Hadlock  Cerebellum tr 38.1 mm 31w 1d 8% Hill  .8 mm 32w 4d 59% Hadlock  Femur 60.8 mm 31w 4d 20% Hadlock  Humerus 53.5 mm 31w 1d 23% Christine  EFW 1,871 g 31w 4d 30% Hadlock  EFW (lb) 4 lb  EFW (oz) 2 oz  EFW by: Hadlock (BPD-HC-AC-FL)  Extended   3.4 mm  CM 6.7 mm  34% Nicolaides  Other Structures   bpm    General Evaluation  ==============    Cardiac activity present.  bpm. Fetal movements: visualized. Presentation: Cephalic  Placenta: Placental site: posterior, appropriate distance from the internal os  Umbilical cord: Cord vessels: 3 vessel cord. Insertion site: placental insertion normal  Amniotic fluid: Amount of AF: normal. MVP 5.9 cm. MYRA 10.4 cm. Q1 5.9 cm, Q2 0.0 cm, Q3 2.7 cm, Q4 1.8 cm    Fetal Anatomy  ===========    Lateral ventricles: normal  Cavum septi pellucidi: normal  Cerebellum: visualized  Cisterna magna: normal  4-chamber view: normal  RVOT view: normal  LVOT view: visualized  Heart / Thorax  Aortic arch view: NOT

## 2024-02-23 ENCOUNTER — HOSPITAL ENCOUNTER (OUTPATIENT)
Facility: HOSPITAL | Age: 39
Discharge: HOME | End: 2024-02-23
Attending: STUDENT IN AN ORGANIZED HEALTH CARE EDUCATION/TRAINING PROGRAM | Admitting: OBSTETRICS & GYNECOLOGY
Payer: COMMERCIAL

## 2024-02-23 VITALS
TEMPERATURE: 97.3 F | OXYGEN SATURATION: 98 % | DIASTOLIC BLOOD PRESSURE: 73 MMHG | RESPIRATION RATE: 16 BRPM | HEART RATE: 77 BPM | SYSTOLIC BLOOD PRESSURE: 137 MMHG

## 2024-02-23 LAB
ALBUMIN SERPL-MCNC: 2.7 G/DL (ref 3.5–5)
ALBUMIN/GLOB SERPL: 0.8 (ref 1.1–2.2)
ALP SERPL-CCNC: 112 U/L (ref 45–117)
ALT SERPL-CCNC: 17 U/L (ref 12–78)
ANION GAP SERPL CALC-SCNC: 6 MMOL/L (ref 5–15)
AST SERPL-CCNC: 13 U/L (ref 15–37)
BASOPHILS # BLD: 0 K/UL (ref 0–0.1)
BASOPHILS NFR BLD: 0 % (ref 0–1)
BILIRUB SERPL-MCNC: 0.2 MG/DL (ref 0.2–1)
BUN SERPL-MCNC: 5 MG/DL (ref 6–20)
BUN/CREAT SERPL: 10 (ref 12–20)
CALCIUM SERPL-MCNC: 9.5 MG/DL (ref 8.5–10.1)
CHLORIDE SERPL-SCNC: 110 MMOL/L (ref 97–108)
CO2 SERPL-SCNC: 21 MMOL/L (ref 21–32)
COMMENT:: NORMAL
CREAT SERPL-MCNC: 0.51 MG/DL (ref 0.55–1.02)
CREAT UR-MCNC: 88 MG/DL
DIFFERENTIAL METHOD BLD: ABNORMAL
EOSINOPHIL # BLD: 0.1 K/UL (ref 0–0.4)
EOSINOPHIL NFR BLD: 1 % (ref 0–7)
ERYTHROCYTE [DISTWIDTH] IN BLOOD BY AUTOMATED COUNT: 13.4 % (ref 11.5–14.5)
GBS, EXTERNAL RESULT: POSITIVE
GLOBULIN SER CALC-MCNC: 3.6 G/DL (ref 2–4)
GLUCOSE SERPL-MCNC: 95 MG/DL (ref 65–100)
HCT VFR BLD AUTO: 32.7 % (ref 35–47)
HGB BLD-MCNC: 11.3 G/DL (ref 11.5–16)
IMM GRANULOCYTES # BLD AUTO: 0.1 K/UL (ref 0–0.04)
IMM GRANULOCYTES NFR BLD AUTO: 1 % (ref 0–0.5)
LYMPHOCYTES # BLD: 2.2 K/UL (ref 0.8–3.5)
LYMPHOCYTES NFR BLD: 20 % (ref 12–49)
MCH RBC QN AUTO: 29.4 PG (ref 26–34)
MCHC RBC AUTO-ENTMCNC: 34.6 G/DL (ref 30–36.5)
MCV RBC AUTO: 84.9 FL (ref 80–99)
MONOCYTES # BLD: 1.3 K/UL (ref 0–1)
MONOCYTES NFR BLD: 11 % (ref 5–13)
NEUTS SEG # BLD: 7.6 K/UL (ref 1.8–8)
NEUTS SEG NFR BLD: 67 % (ref 32–75)
NRBC # BLD: 0 K/UL (ref 0–0.01)
NRBC BLD-RTO: 0 PER 100 WBC
PLATELET # BLD AUTO: 199 K/UL (ref 150–400)
PMV BLD AUTO: 13 FL (ref 8.9–12.9)
POTASSIUM SERPL-SCNC: 3.9 MMOL/L (ref 3.5–5.1)
PROT SERPL-MCNC: 6.3 G/DL (ref 6.4–8.2)
PROT UR-MCNC: 7 MG/DL (ref 0–11.9)
PROT/CREAT UR-RTO: 0.1
RBC # BLD AUTO: 3.85 M/UL (ref 3.8–5.2)
SODIUM SERPL-SCNC: 137 MMOL/L (ref 136–145)
SPECIMEN HOLD: NORMAL
WBC # BLD AUTO: 11.3 K/UL (ref 3.6–11)

## 2024-02-23 PROCEDURE — 85025 COMPLETE CBC W/AUTO DIFF WBC: CPT

## 2024-02-23 PROCEDURE — 82570 ASSAY OF URINE CREATININE: CPT

## 2024-02-23 PROCEDURE — 84156 ASSAY OF PROTEIN URINE: CPT

## 2024-02-23 PROCEDURE — 99204 OFFICE O/P NEW MOD 45 MIN: CPT | Performed by: OBSTETRICS & GYNECOLOGY

## 2024-02-23 PROCEDURE — 80053 COMPREHEN METABOLIC PANEL: CPT

## 2024-02-23 PROCEDURE — 36415 COLL VENOUS BLD VENIPUNCTURE: CPT

## 2024-02-23 PROCEDURE — G0378 HOSPITAL OBSERVATION PER HR: HCPCS

## 2024-02-23 PROCEDURE — 59025 FETAL NON-STRESS TEST: CPT | Performed by: OBSTETRICS & GYNECOLOGY

## 2024-02-23 PROCEDURE — G0379 DIRECT REFER HOSPITAL OBSERV: HCPCS

## 2024-02-24 NOTE — DISCHARGE INSTRUCTIONS
High Blood Pressure in Pregnancy: Care Instructions  Overview     High blood pressure (hypertension) means that the force of blood against your artery walls is too strong.  High blood pressure problems during pregnancy include:  Chronic hypertension. This is high blood pressure that starts before pregnancy.  Gestational hypertension. This is high blood pressure that starts in the second or third trimester of pregnancy.  Preeclampsia. This is a problem that includes high blood pressure and signs of organ injury during pregnancy. In some cases, it leads to eclampsia. Eclampsia causes seizures.  High blood pressure during pregnancy can affect the amount of oxygen and nutrients your baby receives. This can affect how your baby grows. High blood pressure can also cause other serious problems for both you and your baby. For example, the placenta might separate too early from the wall of the uterus (placental abruption). This can cause serious bleeding or premature birth.  To prevent problems, you and your baby will be watched very closely. You will have to check your blood pressure often during and after the pregnancy.  If your blood pressure rises suddenly or is very high during pregnancy, your doctor may prescribe medicines. They can usually control blood pressure.  If your blood pressure affects your or your baby's health, you may need to be monitored in the hospital. You may get medicines. Or your doctor may need to deliver your baby early.  After your baby is born, your blood pressure will probably improve. But sometimes blood pressure problems continue after birth. If you had high blood pressure during pregnancy, you have more risk of having high blood pressure, heart disease, stroke, kidney disease, and diabetes later in life. Work with your doctor to make heart-healthy lifestyle choices. These include eating healthy foods, being active, staying at a healthy weight, and not smoking. Get the checkups you need.  safety. Be sure to make and go to all appointments, and call your doctor if you are having problems. It's also a good idea to know your test results and keep a list of the medicines you take.  How do you count fetal kicks?  A common method of checking your baby's movement is to note the length of time it takes to count 10 movements (such as kicks, flutters, or rolls).  Pick your baby's most active time of day to count. This may be any time from morning to evening.  If you don't feel 10 movements in an hour, have something to eat or drink and count for another hour. If you don't feel at least 10 movements in the 2-hour period, call your doctor.  Do not use an at-home Doppler heart monitor in place of counting fetal movements.  When should you call for help?   Call your doctor now or seek immediate medical care if:    You feel fewer than 10 movements in a 2-hour period.     You noticed that your baby has stopped moving or is moving less than normal.   Watch closely for changes in your health, and be sure to contact your doctor if you have any problems.  Where can you learn more?  Go to https://www.[employer].net/patientEd and enter U048 to learn more about \"Counting Your Baby's Kicks: Care Instructions.\"  Current as of: July 11, 2023               Content Version: 13.9  © 2696-4892 [employer].   Care instructions adapted under license by [employer] If you have questions about a medical condition or this instruction, always ask your healthcare professional. [employer] disclaims any warranty or liability for your use of this information.

## 2024-02-24 NOTE — PROGRESS NOTES
Triage Note    Fernanda Coto  [id number]  1985   35w5d      [facility].   Bps have been wnl since admission.   Labs wnl, PCR 0.1   Will d/c home with precautions.        Ca Silva MD  [facility]       2017

## 2024-02-24 NOTE — PROGRESS NOTES
1816:  The patient presents to labor and delivery from the office for evaluation of elevated blood pressures.      1830:  fetal heart rate is 135 with no decels.  Accellerations were noted.      1840:  IV placed , labs drawn.    1846:  labs sent.      1905:  SBAR report given to Joselin Romo RN

## 2024-02-24 NOTE — PROGRESS NOTES
2140 Discharge instructions reviewed with patient, pt verbalized understanding. Copy of discharge instructions given. IV removed from L wrist, pt tolerated the procedure well.    2148 Pt ambulated off the unit.

## 2024-02-29 ENCOUNTER — ROUTINE PRENATAL (OUTPATIENT)
Age: 39
End: 2024-02-29

## 2024-02-29 VITALS — HEART RATE: 111 BPM | SYSTOLIC BLOOD PRESSURE: 136 MMHG | DIASTOLIC BLOOD PRESSURE: 78 MMHG

## 2024-02-29 DIAGNOSIS — O09.522 ADVANCED MATERNAL AGE IN MULTIGRAVIDA, SECOND TRIMESTER: Primary | ICD-10-CM

## 2024-02-29 DIAGNOSIS — O10.919 CHRONIC HYPERTENSION IN PREGNANCY: ICD-10-CM

## 2024-02-29 DIAGNOSIS — E66.9 OBESITY, CLASS II, BMI 35-39.9, NO COMORBIDITY: ICD-10-CM

## 2024-02-29 DIAGNOSIS — E66.09 CLASS 2 OBESITY DUE TO EXCESS CALORIES WITHOUT SERIOUS COMORBIDITY WITH BODY MASS INDEX (BMI) OF 36.0 TO 36.9 IN ADULT: ICD-10-CM

## 2024-03-01 NOTE — PROCEDURES
PATIENT: JUAN FORTUNE   -  : 1985   -  DOS:2024   -  INTERPRETING PROVIDER:Jessica Santo,   Indication  ========    Hypertension in pregnancy, Obesity in pregnancy, Advanced Maternal Age    Method  ======    Transabdominal ultrasound examination. View: Suboptimal view: limited by late gestational age    Pregnancy  =========    Ly pregnancy. Number of fetuses: 1    Dating  ======    LMP on: 2023  GA by LMP 36 w + 4 d  JAIME by LMP: 3/24/2024  Previous Ultrasound on: 9/15/2023  Type of prior assessment: GA  GA at prior assessment date 12 w + 0 d  GA by previous U/S 35 w + 6 d  JAIME by previous Ultrasound: 3/29/2024  Ultrasound examination on: 2024  GA by U/S based upon: AC, BPD, Femur, HC  GA by U/S 35 w + 2 d  JAIME by U/S: 2024  Assigned: based on the LMP, selected on 2023  Assigned GA 36 w + 4 d  Assigned JAIME: 3/24/2024    Fetal Biometry  ============    Standard  BPD 85.4 mm 34w 3d 10% Hadlock  .0 mm 35w 0d 17% Christine  .8 mm 34w 2d <1% Hadlock  .4 mm 37w 5d 87% Hadlock  Femur 66.9 mm 34w 3d 6% Hadlock  EFW 2,856 g 36w 1d 42% Hadlock  EFW (lb) 6 lb  EFW (oz) 5 oz  EFW by: Hadlock (BPD-HC-AC-FL)  Extended   2.6 mm  Other Structures   bpm    General Evaluation  ==============    Cardiac activity present.  bpm. Fetal movements: visualized. Presentation: Cephalic  Placenta: Placental site: posterior, appropriate distance from the internal os  Umbilical cord: Cord vessels: 3 vessel cord. Insertion site: placental insertion normal  Amniotic fluid: Amount of AF: normal. MVP 3.7 cm. MYRA 8.5 cm. Q1 3.7 cm, Q2 1.7 cm, Q3 0.0 cm, Q4 3.1 cm    Fetal Anatomy  ===========    Heart / Thorax  Aortic arch view: NOT VISUALIZED  Ductal arch view: NOT VISUALIZED  Stomach: normal  Kidneys: normal  Bladder: normal  Wants to know fetal sex: yes    Biophysical Profile  ==============    2: Fetal breathing movements  2: Gross body movements  2: Fetal tone  2:

## 2024-03-02 NOTE — PROGRESS NOTES
ASSESSMENT/PLAN:  1. Advanced maternal age in multigravida, second trimester  2. Obesity, Class II, BMI 35-39.9, no comorbidity  3. Chronic hypertension in pregnancy    37 yo  whose pregnancy is complicated by:     1) AMA/Obesity Class II   -Gtt WNL 23     2) CHTN   -Current /78   -Labetalol 200 mg PO BID   - Taking ASA 81mg qd   - Previously stated 24-hour urine pro WNL   - 23 EKG WNL   -Kick count instructions and PIH precautions reviewed.     3) cffDNA was low-risk     4) Low lying placenta - resolved     Recommendations:   -Serial interval growth every 4 weeks   - Continue weekly  testing.   -For women with chronic hypertension that is well controlled on antihypertensives, ACOG recommends delivery between 37 0/7 weeks and 39 6/7     Please see Viewpoint for Ultrasound findings.     Amrik Coto (:  1985) is a 38 y.o. female,Established patient, here for evaluation of the following chief complaint(s):  1. Advanced maternal age in multigravida, second trimester  2. Obesity, Class II, BMI 35-39.9, no comorbidity  3. Chronic hypertension in pregnancy       Objective   Physical Exam  Vitals reviewed.   Constitutional:       Appearance: Normal appearance.   Neurological:      Mental Status: She is alert.   Psychiatric:         Mood and Affect: Mood normal.         Judgment: Judgment normal.        On this date 2024 I have spent 25 minutes reviewing previous notes, test results and face to face with the patient discussing the diagnosis and importance of compliance with the treatment plan as well as documenting on the day of the visit.      An electronic signature was used to authenticate this note.    --Goldie Paulson, HALEY - CNP

## 2024-03-07 ENCOUNTER — ROUTINE PRENATAL (OUTPATIENT)
Age: 39
End: 2024-03-07
Payer: COMMERCIAL

## 2024-03-07 VITALS — DIASTOLIC BLOOD PRESSURE: 73 MMHG | HEART RATE: 90 BPM | SYSTOLIC BLOOD PRESSURE: 123 MMHG

## 2024-03-07 DIAGNOSIS — E66.9 OBESITY, CLASS II, BMI 35-39.9, NO COMORBIDITY: ICD-10-CM

## 2024-03-07 DIAGNOSIS — O09.522 ADVANCED MATERNAL AGE IN MULTIGRAVIDA, SECOND TRIMESTER: ICD-10-CM

## 2024-03-07 DIAGNOSIS — O10.913 CHRONIC HYPERTENSION COMPLICATING OR REASON FOR CARE DURING PREGNANCY, THIRD TRIMESTER: Primary | ICD-10-CM

## 2024-03-07 PROCEDURE — 99212 OFFICE O/P EST SF 10 MIN: CPT

## 2024-03-07 PROCEDURE — 76819 FETAL BIOPHYS PROFIL W/O NST: CPT | Performed by: OBSTETRICS & GYNECOLOGY

## 2024-03-07 NOTE — PROGRESS NOTES
ASSESSMENT/PLAN:  1. Chronic hypertension complicating or reason for care during pregnancy, third trimester  2. Advanced maternal age in multigravida, second trimester  3. Obesity, Class II, BMI 35-39.9, no comorbidity    37 yo  whose pregnancy is complicated by:     1) AMA/Obesity Class II   -Gtt WNL 23  -Kick count instructions and PIH precautions reviewed      2) CHTN   - Current /73  - Labetalol 200 mg PO BID  - Taking ASA 81mg qd   - Previously stated 24-hour urine pro WNL  - 23 EKG WNL     3) cffDNA was low-risk     4) Low lying placenta - resolved     Recommendations:  - Serial interval growth every 4 weeks   - Continue weekly  testing.  - For women with chronic hypertension that is well controlled on antihypertensives, ACOG recommends delivery between 37 0/7 weeks and 39 6/7 weeks.        Amrik Coto (:  1985) is a 38 y.o. female,Established patient, here for evaluation of the following chief complaint(s):  1. Chronic hypertension complicating or reason for care during pregnancy, third trimester  2. Advanced maternal age in multigravida, second trimester  3. Obesity, Class II, BMI 35-39.9, no comorbidity       Objective   Physical Exam  Vitals reviewed.   Constitutional:       Appearance: Normal appearance.   Neurological:      Mental Status: She is alert.   Psychiatric:         Mood and Affect: Mood normal.         Judgment: Judgment normal.          On this date 3/7/2024 I have spent 15 minutes reviewing previous notes, test results and face to face with the patient discussing the diagnosis and importance of compliance with the treatment plan as well as documenting on the day of the visit.      An electronic signature was used to authenticate this note.    --HALEY Trujillo - CNP

## 2024-03-07 NOTE — PROCEDURES
PATIENT: JUAN FORTUNE   -  : 1985   -  DOS:2024   -  INTERPRETING PROVIDER:Gregory Carter,   Indication  ========    Hypertension in pregnancy, Obesity in pregnancy, Advanced Maternal Age    Method  ======    Transabdominal ultrasound examination. View: Sufficient    Pregnancy  =========    Ly pregnancy. Number of fetuses: 1    Dating  ======    LMP on: 2023  GA by LMP 37 w + 4 d  JAIME by LMP: 3/24/2024  Previous Ultrasound on: 9/15/2023  Type of prior assessment: GA  GA at prior assessment date 12 w + 0 d  GA by previous U/S 36 w + 6 d  JAIME by previous Ultrasound: 3/29/2024  Assigned: based on the LMP, selected on 2023  Assigned GA 37 w + 4 d  Assigned JAIME: 3/24/2024    General Evaluation  ==============    Cardiac activity present.  bpm. Fetal movements: visualized. Presentation: Cephalic  Placenta: Placental site: posterior, appropriate distance from the internal os  Umbilical cord: Cord vessels: 3 vessel cord    Fetal Anatomy  ===========    Heart / Thorax  Aortic arch view: NOT VISUALIZED  Ductal arch view: NOT VISUALIZED  Stomach: normal  Kidneys: normal  Bladder: normal  Wants to know fetal sex: yes    Amniotic Fluid Assessment  =====================    Amount of AF: normal  MVP 4.3 cm. MYRA 15.4 cm. Q1 4.3 cm, Q2 4.3 cm, Q3 3.5 cm, Q4 3.2 cm    Biophysical Profile  ==============    2: Fetal breathing movements  2: Gross body movements  2: Fetal tone  2: Amniotic fluid volume  8/8 Biophysical profile score    Findings  =======    Intrauterine Ly pregnancy at 37w 4d by clinical dates.  Amniotic fluid is normal.  Placenta is posterior, appropriate distance from the internal os.  Biophysical profile score is 8/8.  Cephalic presentation. Examination was technically difficult due to maternal body habitus. Not all anatomic structures can be optimally visualized today and may not be  able to be seen during this pregnancy. Ultrasound cannot detect all fetal

## 2024-03-14 ENCOUNTER — ROUTINE PRENATAL (OUTPATIENT)
Age: 39
End: 2024-03-14

## 2024-03-14 VITALS
SYSTOLIC BLOOD PRESSURE: 137 MMHG | BODY MASS INDEX: 41.09 KG/M2 | HEART RATE: 92 BPM | WEIGHT: 286.4 LBS | DIASTOLIC BLOOD PRESSURE: 83 MMHG

## 2024-03-14 DIAGNOSIS — O10.913 CHRONIC HYPERTENSION COMPLICATING OR REASON FOR CARE DURING PREGNANCY, THIRD TRIMESTER: ICD-10-CM

## 2024-03-14 DIAGNOSIS — O09.522 ADVANCED MATERNAL AGE IN MULTIGRAVIDA, SECOND TRIMESTER: ICD-10-CM

## 2024-03-14 DIAGNOSIS — O10.913 CHRONIC HYPERTENSION COMPLICATING OR REASON FOR CARE DURING PREGNANCY, THIRD TRIMESTER: Primary | ICD-10-CM

## 2024-03-14 DIAGNOSIS — E66.9 OBESITY, CLASS II, BMI 35-39.9, NO COMORBIDITY: ICD-10-CM

## 2024-03-14 DIAGNOSIS — O09.522 ADVANCED MATERNAL AGE IN MULTIGRAVIDA, SECOND TRIMESTER: Primary | ICD-10-CM

## 2024-03-14 NOTE — PROGRESS NOTES
ASSESSMENT/PLAN:  1. Advanced maternal age in multigravida, second trimester  2. Obesity, Class II, BMI 35-39.9, no comorbidity  3. Chronic hypertension complicating or reason for care during pregnancy, third trimester    39 yo  whose pregnancy is complicated by:     1) AMA/Obesity Class II   -Gtt WNL 23  -Kick count instructions reviewed     2) CHTN   - Current /81, repeat 137/83 Pt may not have taken BP meds this am (pt reports that she was distracted while taking/ attempting to take BP medication this morning and to avoid a possible medication error she did not take a possible second dose.)  - PIH precautions reviewed   - Labetalol 200 mg PO BID  - Taking ASA 81mg qd   - Previously stated 24-hour urine pro WNL  - 23 EKG WNL     3) cffDNA was low-risk     4) Low lying placenta - resolved     Recommendations:  - Serial interval growth every 4 weeks   - Continue weekly  testing.  - For women with chronic hypertension that is well controlled on antihypertensives, ACOG recommends delivery between 37 0/7 weeks and 39 6/7 weeks.     BPP Scheduled 3/19/24      Please See Viewpoint for ultrasound findings       Amrik Coto (:  1985) is a 38 y.o. female,Established patient, here for evaluation of the following chief complaint(s):  1. Advanced maternal age in multigravida, second trimester  2. Obesity, Class II, BMI 35-39.9, no comorbidity  3. Chronic hypertension complicating or reason for care during pregnancy, third trimester       Objective   Physical Exam  Vitals reviewed.   Constitutional:       Appearance: Normal appearance.   Neurological:      Mental Status: She is alert.   Psychiatric:         Mood and Affect: Mood normal.         Judgment: Judgment normal.      On this date 3/14/2024 I have spent 15 minutes reviewing previous notes, test results and face to face with the patient discussing the diagnosis and importance of compliance with the treatment plan as

## 2024-03-15 NOTE — PROCEDURES
PATIENT: JUAN FORTUNE   -  : 1985   -  DOS:2024   -  INTERPRETING PROVIDER:Jessica Santo,   Indication  ========    Hypertension in pregnancy, Obesity in pregnancy, Advanced Maternal Age    Method  ======    Transabdominal ultrasound examination. View: Sufficient    Pregnancy  =========    Ly pregnancy. Number of fetuses: 1    Dating  ======    LMP on: 2023  GA by LMP 38 w + 4 d  JAIME by LMP: 3/24/2024  Previous Ultrasound on: 9/15/2023  Type of prior assessment: GA  GA at prior assessment date 12 w + 0 d  GA by previous U/S 37 w + 6 d  JAIME by previous Ultrasound: 3/29/2024  Assigned: based on the LMP, selected on 2023  Assigned GA 38 w + 4 d  Assigned JAIME: 3/24/2024    General Evaluation  ==============    Cardiac activity present.  bpm. Fetal movements: visualized. Presentation: Cephalic  Placenta: Placental site: posterior, appropriate distance from the internal os  Umbilical cord: Cord vessels: 3 vessel cord    Fetal Anatomy  ===========    Heart / Thorax  Aortic arch view: NOT VISUALIZED  Ductal arch view: NOT VISUALIZED  Stomach: normal  Kidneys: normal  Bladder: normal  Wants to know fetal sex: yes    Amniotic Fluid Assessment  =====================    Amount of AF: normal  MVP 4.3 cm. MYRA 11.5 cm. Q1 3.7 cm, Q2 0.0 cm, Q3 4.3 cm, Q4 3.6 cm    Biophysical Profile  ==============    2: Fetal breathing movements  2: Gross body movements  2: Fetal tone  2: Amniotic fluid volume  8/8 Biophysical profile score    Findings  =======    Intrauterine Ly pregnancy at 38w 4d by clinical dates.  Amniotic fluid is normal.  Placenta is posterior, appropriate distance from the internal os.  Biophysical profile score is 8/8.  Cephalic presentation.    Consultation  ==========    NP Note 3/14/24  37 yo  whose pregnancy is complicated by:    1) AMA/Obesity Class II  -Gtt WNL 23  -Kick count instructions reviewed    2) CHTN  - Current /81, repeat 137/83 Pt

## 2024-03-19 ENCOUNTER — ANESTHESIA (OUTPATIENT)
Facility: HOSPITAL | Age: 39
End: 2024-03-19
Payer: COMMERCIAL

## 2024-03-19 ENCOUNTER — HOSPITAL ENCOUNTER (INPATIENT)
Facility: HOSPITAL | Age: 39
LOS: 2 days | Discharge: HOME | End: 2024-03-21
Attending: STUDENT IN AN ORGANIZED HEALTH CARE EDUCATION/TRAINING PROGRAM | Admitting: STUDENT IN AN ORGANIZED HEALTH CARE EDUCATION/TRAINING PROGRAM
Payer: COMMERCIAL

## 2024-03-19 ENCOUNTER — ANESTHESIA EVENT (OUTPATIENT)
Facility: HOSPITAL | Age: 39
End: 2024-03-19
Payer: COMMERCIAL

## 2024-03-19 LAB
ABO + RH BLD: NORMAL
ALBUMIN SERPL-MCNC: 2.8 G/DL (ref 3.5–5)
ALBUMIN/GLOB SERPL: 0.8 (ref 1.1–2.2)
ALP SERPL-CCNC: 136 U/L (ref 45–117)
ALT SERPL-CCNC: 18 U/L (ref 12–78)
ANION GAP SERPL CALC-SCNC: 6 MMOL/L (ref 5–15)
AST SERPL-CCNC: 17 U/L (ref 15–37)
BILIRUB SERPL-MCNC: 0.2 MG/DL (ref 0.2–1)
BLOOD GROUP ANTIBODIES SERPL: NORMAL
BUN SERPL-MCNC: 6 MG/DL (ref 6–20)
BUN/CREAT SERPL: 13 (ref 12–20)
CALCIUM SERPL-MCNC: 9 MG/DL (ref 8.5–10.1)
CHLORIDE SERPL-SCNC: 111 MMOL/L (ref 97–108)
CO2 SERPL-SCNC: 22 MMOL/L (ref 21–32)
CREAT SERPL-MCNC: 0.48 MG/DL (ref 0.55–1.02)
ERYTHROCYTE [DISTWIDTH] IN BLOOD BY AUTOMATED COUNT: 13.6 % (ref 11.5–14.5)
GLOBULIN SER CALC-MCNC: 3.5 G/DL (ref 2–4)
GLUCOSE SERPL-MCNC: 113 MG/DL (ref 65–100)
HCT VFR BLD AUTO: 32.2 % (ref 35–47)
HGB BLD-MCNC: 11.2 G/DL (ref 11.5–16)
MCH RBC QN AUTO: 29.5 PG (ref 26–34)
MCHC RBC AUTO-ENTMCNC: 34.8 G/DL (ref 30–36.5)
MCV RBC AUTO: 84.7 FL (ref 80–99)
NRBC # BLD: 0 K/UL (ref 0–0.01)
NRBC BLD-RTO: 0 PER 100 WBC
PLATELET # BLD AUTO: 220 K/UL (ref 150–400)
PMV BLD AUTO: 12 FL (ref 8.9–12.9)
POTASSIUM SERPL-SCNC: 3.7 MMOL/L (ref 3.5–5.1)
PROT SERPL-MCNC: 6.3 G/DL (ref 6.4–8.2)
RBC # BLD AUTO: 3.8 M/UL (ref 3.8–5.2)
SODIUM SERPL-SCNC: 139 MMOL/L (ref 136–145)
SPECIMEN EXP DATE BLD: NORMAL
WBC # BLD AUTO: 12.4 K/UL (ref 3.6–11)

## 2024-03-19 PROCEDURE — 2500000003 HC RX 250 WO HCPCS: Performed by: NURSE ANESTHETIST, CERTIFIED REGISTERED

## 2024-03-19 PROCEDURE — 6360000002 HC RX W HCPCS: Performed by: STUDENT IN AN ORGANIZED HEALTH CARE EDUCATION/TRAINING PROGRAM

## 2024-03-19 PROCEDURE — 1100000000 HC RM PRIVATE

## 2024-03-19 PROCEDURE — 7210000100 HC LABOR FEE PER 1 HR: Performed by: ADVANCED PRACTICE MIDWIFE

## 2024-03-19 PROCEDURE — 2580000003 HC RX 258: Performed by: STUDENT IN AN ORGANIZED HEALTH CARE EDUCATION/TRAINING PROGRAM

## 2024-03-19 PROCEDURE — 36415 COLL VENOUS BLD VENIPUNCTURE: CPT

## 2024-03-19 PROCEDURE — 86901 BLOOD TYPING SEROLOGIC RH(D): CPT

## 2024-03-19 PROCEDURE — 86780 TREPONEMA PALLIDUM: CPT

## 2024-03-19 PROCEDURE — 7220000101 HC DELIVERY VAGINAL/SINGLE: Performed by: ADVANCED PRACTICE MIDWIFE

## 2024-03-19 PROCEDURE — 4A1HXCZ MONITORING OF PRODUCTS OF CONCEPTION, CARDIAC RATE, EXTERNAL APPROACH: ICD-10-PCS | Performed by: STUDENT IN AN ORGANIZED HEALTH CARE EDUCATION/TRAINING PROGRAM

## 2024-03-19 PROCEDURE — 3700000156 HC EPIDURAL ANESTHESIA: Performed by: NURSE ANESTHETIST, CERTIFIED REGISTERED

## 2024-03-19 PROCEDURE — 86850 RBC ANTIBODY SCREEN: CPT

## 2024-03-19 PROCEDURE — 3700000025 EPIDURAL BLOCK: Performed by: STUDENT IN AN ORGANIZED HEALTH CARE EDUCATION/TRAINING PROGRAM

## 2024-03-19 PROCEDURE — 86900 BLOOD TYPING SEROLOGIC ABO: CPT

## 2024-03-19 PROCEDURE — 00HU33Z INSERTION OF INFUSION DEVICE INTO SPINAL CANAL, PERCUTANEOUS APPROACH: ICD-10-PCS | Performed by: ANESTHESIOLOGY

## 2024-03-19 PROCEDURE — 10H07YZ INSERTION OF OTHER DEVICE INTO PRODUCTS OF CONCEPTION, VIA NATURAL OR ARTIFICIAL OPENING: ICD-10-PCS | Performed by: STUDENT IN AN ORGANIZED HEALTH CARE EDUCATION/TRAINING PROGRAM

## 2024-03-19 PROCEDURE — 85027 COMPLETE CBC AUTOMATED: CPT

## 2024-03-19 PROCEDURE — 51702 INSERT TEMP BLADDER CATH: CPT

## 2024-03-19 PROCEDURE — 6360000002 HC RX W HCPCS: Performed by: NURSE ANESTHETIST, CERTIFIED REGISTERED

## 2024-03-19 PROCEDURE — 10907ZC DRAINAGE OF AMNIOTIC FLUID, THERAPEUTIC FROM PRODUCTS OF CONCEPTION, VIA NATURAL OR ARTIFICIAL OPENING: ICD-10-PCS | Performed by: STUDENT IN AN ORGANIZED HEALTH CARE EDUCATION/TRAINING PROGRAM

## 2024-03-19 PROCEDURE — 80053 COMPREHEN METABOLIC PANEL: CPT

## 2024-03-19 RX ORDER — LANOLIN/MINERAL OIL
LOTION (ML) TOPICAL PRN
Status: DISCONTINUED | OUTPATIENT
Start: 2024-03-19 | End: 2024-03-21 | Stop reason: HOSPADM

## 2024-03-19 RX ORDER — SODIUM CHLORIDE, SODIUM LACTATE, POTASSIUM CHLORIDE, CALCIUM CHLORIDE 600; 310; 30; 20 MG/100ML; MG/100ML; MG/100ML; MG/100ML
INJECTION, SOLUTION INTRAVENOUS CONTINUOUS
Status: DISCONTINUED | OUTPATIENT
Start: 2024-03-19 | End: 2024-03-19

## 2024-03-19 RX ORDER — TRANEXAMIC ACID 10 MG/ML
1000 INJECTION, SOLUTION INTRAVENOUS
Status: DISCONTINUED | OUTPATIENT
Start: 2024-03-19 | End: 2024-03-19

## 2024-03-19 RX ORDER — SODIUM CHLORIDE, SODIUM LACTATE, POTASSIUM CHLORIDE, AND CALCIUM CHLORIDE .6; .31; .03; .02 G/100ML; G/100ML; G/100ML; G/100ML
1000 INJECTION, SOLUTION INTRAVENOUS PRN
Status: DISCONTINUED | OUTPATIENT
Start: 2024-03-19 | End: 2024-03-19

## 2024-03-19 RX ORDER — FENTANYL/BUPIVACAINE/NS/PF 2-1250MCG
12 PLASTIC BAG, INJECTION (ML) INJECTION CONTINUOUS
Status: DISCONTINUED | OUTPATIENT
Start: 2024-03-19 | End: 2024-03-19

## 2024-03-19 RX ORDER — SODIUM CHLORIDE 9 MG/ML
INJECTION, SOLUTION INTRAVENOUS PRN
Status: DISCONTINUED | OUTPATIENT
Start: 2024-03-19 | End: 2024-03-21

## 2024-03-19 RX ORDER — LIDOCAINE HYDROCHLORIDE AND EPINEPHRINE 15; 5 MG/ML; UG/ML
INJECTION, SOLUTION EPIDURAL PRN
Status: DISCONTINUED | OUTPATIENT
Start: 2024-03-19 | End: 2024-03-19 | Stop reason: SDUPTHER

## 2024-03-19 RX ORDER — ONDANSETRON 2 MG/ML
4 INJECTION INTRAMUSCULAR; INTRAVENOUS EVERY 6 HOURS PRN
Status: DISCONTINUED | OUTPATIENT
Start: 2024-03-19 | End: 2024-03-19

## 2024-03-19 RX ORDER — DOCUSATE SODIUM 100 MG/1
100 CAPSULE, LIQUID FILLED ORAL 2 TIMES DAILY
Status: DISCONTINUED | OUTPATIENT
Start: 2024-03-20 | End: 2024-03-21 | Stop reason: HOSPADM

## 2024-03-19 RX ORDER — LABETALOL 200 MG/1
200 TABLET, FILM COATED ORAL EVERY 12 HOURS SCHEDULED
Status: DISCONTINUED | OUTPATIENT
Start: 2024-03-19 | End: 2024-03-21 | Stop reason: HOSPADM

## 2024-03-19 RX ORDER — ONDANSETRON 4 MG/1
4 TABLET, ORALLY DISINTEGRATING ORAL EVERY 6 HOURS PRN
Status: DISCONTINUED | OUTPATIENT
Start: 2024-03-19 | End: 2024-03-19

## 2024-03-19 RX ORDER — MISOPROSTOL 200 UG/1
400 TABLET ORAL PRN
Status: DISCONTINUED | OUTPATIENT
Start: 2024-03-19 | End: 2024-03-19

## 2024-03-19 RX ORDER — METHYLERGONOVINE MALEATE 0.2 MG/ML
200 INJECTION INTRAVENOUS PRN
Status: DISCONTINUED | OUTPATIENT
Start: 2024-03-19 | End: 2024-03-19

## 2024-03-19 RX ORDER — IBUPROFEN 600 MG/1
600 TABLET ORAL EVERY 6 HOURS PRN
Status: DISCONTINUED | OUTPATIENT
Start: 2024-03-19 | End: 2024-03-21 | Stop reason: HOSPADM

## 2024-03-19 RX ORDER — SODIUM CHLORIDE 0.9 % (FLUSH) 0.9 %
5-40 SYRINGE (ML) INJECTION PRN
Status: DISCONTINUED | OUTPATIENT
Start: 2024-03-19 | End: 2024-03-21

## 2024-03-19 RX ORDER — SODIUM CHLORIDE, SODIUM LACTATE, POTASSIUM CHLORIDE, AND CALCIUM CHLORIDE .6; .31; .03; .02 G/100ML; G/100ML; G/100ML; G/100ML
500 INJECTION, SOLUTION INTRAVENOUS PRN
Status: DISCONTINUED | OUTPATIENT
Start: 2024-03-19 | End: 2024-03-19

## 2024-03-19 RX ORDER — SODIUM CHLORIDE 0.9 % (FLUSH) 0.9 %
5-40 SYRINGE (ML) INJECTION EVERY 12 HOURS SCHEDULED
Status: DISCONTINUED | OUTPATIENT
Start: 2024-03-20 | End: 2024-03-21

## 2024-03-19 RX ORDER — NALOXONE HYDROCHLORIDE 0.4 MG/ML
INJECTION, SOLUTION INTRAMUSCULAR; INTRAVENOUS; SUBCUTANEOUS PRN
Status: DISCONTINUED | OUTPATIENT
Start: 2024-03-19 | End: 2024-03-19

## 2024-03-19 RX ORDER — BUPIVACAINE HYDROCHLORIDE 2.5 MG/ML
INJECTION, SOLUTION EPIDURAL; INFILTRATION; INTRACAUDAL PRN
Status: DISCONTINUED | OUTPATIENT
Start: 2024-03-19 | End: 2024-03-19 | Stop reason: SDUPTHER

## 2024-03-19 RX ORDER — EPHEDRINE SULFATE/0.9% NACL/PF 25 MG/5 ML
10 SYRINGE (ML) INTRAVENOUS EVERY 5 MIN PRN
Status: DISCONTINUED | OUTPATIENT
Start: 2024-03-19 | End: 2024-03-19

## 2024-03-19 RX ORDER — ACETAMINOPHEN 325 MG/1
650 TABLET ORAL EVERY 6 HOURS PRN
Status: DISCONTINUED | OUTPATIENT
Start: 2024-03-19 | End: 2024-03-21 | Stop reason: HOSPADM

## 2024-03-19 RX ORDER — CARBOPROST TROMETHAMINE 250 UG/ML
250 INJECTION, SOLUTION INTRAMUSCULAR PRN
Status: DISCONTINUED | OUTPATIENT
Start: 2024-03-19 | End: 2024-03-19

## 2024-03-19 RX ADMIN — LIDOCAINE HYDROCHLORIDE AND EPINEPHRINE 2 ML: 15; 5 INJECTION, SOLUTION EPIDURAL at 16:58

## 2024-03-19 RX ADMIN — SODIUM CHLORIDE 2.5 MILLION UNITS: 9 INJECTION, SOLUTION INTRAVENOUS at 14:40

## 2024-03-19 RX ADMIN — OXYTOCIN 2 MILLI-UNITS/MIN: 10 INJECTION, SOLUTION INTRAMUSCULAR; INTRAVENOUS at 07:59

## 2024-03-19 RX ADMIN — SODIUM CHLORIDE, POTASSIUM CHLORIDE, SODIUM LACTATE AND CALCIUM CHLORIDE: 600; 310; 30; 20 INJECTION, SOLUTION INTRAVENOUS at 07:27

## 2024-03-19 RX ADMIN — SODIUM CHLORIDE 2.5 MILLION UNITS: 9 INJECTION, SOLUTION INTRAVENOUS at 18:58

## 2024-03-19 RX ADMIN — SODIUM CHLORIDE, POTASSIUM CHLORIDE, SODIUM LACTATE AND CALCIUM CHLORIDE 1000 ML: 600; 310; 30; 20 INJECTION, SOLUTION INTRAVENOUS at 16:16

## 2024-03-19 RX ADMIN — Medication 12 ML/HR: at 17:06

## 2024-03-19 RX ADMIN — SODIUM CHLORIDE 5 MILLION UNITS: 900 INJECTION INTRAVENOUS at 07:27

## 2024-03-19 RX ADMIN — SODIUM CHLORIDE, POTASSIUM CHLORIDE, SODIUM LACTATE AND CALCIUM CHLORIDE 1000 ML: 600; 310; 30; 20 INJECTION, SOLUTION INTRAVENOUS at 15:14

## 2024-03-19 RX ADMIN — SODIUM CHLORIDE 2.5 MILLION UNITS: 9 INJECTION, SOLUTION INTRAVENOUS at 11:13

## 2024-03-19 RX ADMIN — BUPIVACAINE HYDROCHLORIDE 7 ML: 2.5 INJECTION, SOLUTION EPIDURAL; INFILTRATION; INTRACAUDAL; PERINEURAL at 17:05

## 2024-03-19 RX ADMIN — SODIUM CHLORIDE 2.5 MILLION UNITS: 9 INJECTION, SOLUTION INTRAVENOUS at 22:23

## 2024-03-19 RX ADMIN — LIDOCAINE HYDROCHLORIDE AND EPINEPHRINE 3 ML: 15; 5 INJECTION, SOLUTION EPIDURAL at 16:56

## 2024-03-19 RX ADMIN — SODIUM CHLORIDE, POTASSIUM CHLORIDE, SODIUM LACTATE AND CALCIUM CHLORIDE: 600; 310; 30; 20 INJECTION, SOLUTION INTRAVENOUS at 17:48

## 2024-03-19 NOTE — PROGRESS NOTES
Labor Note    Fernanda Coto  [id number]  1985   39w2d      S:  Feeling comfortable with contractions     O:      Vitals:    24 1325   BP:    Pulse:    Resp:    Temp: 98.8 °F (37.1 °C)   SpO2: 96%     FHT: 120/mod/+accels/no decels  [place]: q 5min    A/P:  38 y.o.  @ 39w2d admitted for IOL for cHTN     - GBS pos / Rhpos  - GBS received adequate ppx   - FWB:  CEFM/[place]  - Labor course - pitocin currently at 12, AROM for clr, IUPC placed to trace contractions   - Pain control - PCEA on request  - Hemorrhage risk: moderate-high, will place second IV if >20 mU   - VTE risk: baseline   - Anticipate       Carolyn Zambrano MD  [facility]        207

## 2024-03-19 NOTE — PROGRESS NOTES
1905: Bedside and Verbal shift change report given to Indy RN (oncoming nurse) by Janette RN (offgoing nurse). Report included the following information Nurse Handoff Report, Index, Intake/Output, MAR, Recent Results, and Med Rec Status.     1906: This RN turning pt on right side with peanut ball due to late decelerations.     2227: This RN at bedside turning pt. This RN attempting to trace EFM at this time.      2244: This RN tracing EFM very low. Pt reporting increased pressure. SVE performed 10/100/+3.     2250: Patient actively pushing.  RN remains in continuous attendance at the bedside.  Assessment & evaluation of fetal heart rate ongoing via continuous EFM.    22:50:50: RN remained at bedside throughout pushing.  EFM continuously assessed.  Vaginal delivery of viable infant.    0120: This RN assisting pt to restroom. Pt tolerating activity well. Pt voiding at this time. Pads and gown changed.    0238: TRANSFER - OUT REPORT:    Verbal report given to Yessi QUINTERO on Fernanda Coto  being transferred to MIU for routine progression of patient care       Report consisted of patient's Situation, Background, Assessment and   Recommendations(SBAR).     Information from the following report(s) Nurse Handoff Report, Index, Intake/Output, MAR, Recent Results, and Med Rec Status was reviewed with the receiving nurse.           Lines:   Peripheral IV 03/19/24 Proximal;Right Forearm (Active)   Site Assessment Clean, dry & intact 03/19/24 1915   Line Status Infusing 03/19/24 1915   Line Care Connections checked and tightened 03/19/24 1915   Phlebitis Assessment No symptoms 03/19/24 1915   Infiltration Assessment 0 03/19/24 1915   Alcohol Cap Used No 03/19/24 1915   Dressing Status Clean, dry & intact 03/19/24 1915   Dressing Type Transparent 03/19/24 1915        Opportunity for questions and clarification was provided. Fundal assessment performed at bedside.     Patient transported with:  Registered Nurse

## 2024-03-19 NOTE — PROGRESS NOTES
1130: Bedside shift change report given to Amita RN (oncoming nurse) by Nancy RN (offgoing nurse). Report included the following information Nurse Handoff Report, Adult Overview, MAR, Recent Results, Med Rec Status, and Quality Measures.     1430: Dr Zambrano at bedside. SVE per MD 5/60/-2. AROM clear fluid. IUPC placed by MD.    1643: Kayla MENDEZ at bedside. Pt positioned for epidural.     1646: time out done for epidural    1656: test dose done and tolerated.      1705: Dr Zambrano at bedside. SVE per MD 6/100/-2. MD to place orders for pit to go up to 30 miu. 2nd IV placed.     1900: Bedside shift change report given to Khai RN (oncoming nurse) by Amita RN (offgoing nurse). Report included the following information Nurse Handoff Report, Intake/Output, MAR, Recent Results, and Quality Measures.

## 2024-03-19 NOTE — ANESTHESIA PRE PROCEDURE
Department of Anesthesiology  Preprocedure Note       Name:  Fernanda Coto   Age:  38 y.o.  :  1985                                          MRN:  [id number]         Date:  3/19/2024      Surgeon: * No surgeons listed *    Procedure: * No procedures listed *    Medications prior to admission:   Prior to Admission medications    Medication Sig Start Date End Date Taking? Authorizing Provider   labetalol (NORMODYNE) 200 MG tablet Take 1 tablet by mouth 2 times daily 24   Luis Bhatia MD   aspirin 81 MG EC tablet Take 1 tablet by mouth daily    ProviderLuis MD   Prenatal Vit-Fe Fumarate-FA (PRENATAL VITAMINS PO) Take by mouth    ProviderLuis MD   ibuprofen (ADVIL;MOTRIN) 800 MG tablet Take 800 mg by mouth every 8 hours as needed  Patient not taking: Reported on 2023 3/8/21   Automatic Reconciliation, Ar       Current medications:    Current Facility-Administered Medications   Medication Dose Route Frequency Provider Last Rate Last Admin   • lactated ringers IV soln infusion   IntraVENous Continuous Ginger Pastrana DO   Stopped at 24 1513   • lactated ringers bolus 500 mL  500 mL IntraVENous PRN Ginger Pastrana DO        Or   • lactated ringers bolus 1,000 mL  1,000 mL IntraVENous PRN Ginger Pastrana  mL/hr at 24 1616 1,000 mL at 24 1616   • methylergonovine (METHERGINE) injection 200 mcg  200 mcg IntraMUSCular PRN Ginger Pastrana DO       • carboprost (HEMABATE) injection 250 mcg  250 mcg IntraMUSCular PRN Ginger Pastrana DO       • miSOPROStol (CYTOTEC) tablet 400 mcg  400 mcg Buccal PRN Ginger Pastrana DO       • tranexamic acid-NaCl IVPB premix 1,000 mg  1,000 mg IntraVENous Once PRN Ginger Pastrana DO       • oxytocin (PITOCIN) 30 units in 500 mL infusion  87.3 jin-units/min IntraVENous Continuous PRN Ginger Pastrana DO        And   • oxytocin (PITOCIN) 10 unit bolus from the bag  10 Units IntraVENous PRN

## 2024-03-19 NOTE — PROGRESS NOTES
Labor Progress Note    S/p pcea   SVE /-2  Fetal tracing and maternal VSS reviewed  FHT moderate variability, no accels, no decels  [place] Q5-6  Pitocin 14  Continue titration  Will place 2 IVS   Anticipated       Carolyn Zambrano MD

## 2024-03-19 NOTE — ANESTHESIA PROCEDURE NOTES
Epidural Block    Patient location during procedure: OB  Start time: 3/19/2024 4:46 PM  End time: 3/19/2024 4:56 PM  Reason for block: labor epidural  Staffing  Performed: resident/CRNA   Anesthesiologist: Marcin Matthews MD  Resident/CRNA: Wesley Parra APRN - CRNA  Performed by: Wesley Parra APRN - CRNA  Authorized by: Marcin Matthews MD    Epidural  Patient position: sitting  Prep: ChloraPrep  Patient monitoring: continuous pulse ox and frequent blood pressure checks  Approach: midline  Location: L3-4  Injection technique: BRICE air  Provider prep: mask and sterile gloves  Needle  Needle type: Tuohy   Needle gauge: 17 G  Needle length: 3.5 in  Needle insertion depth: 9 cm  Catheter type: multi-orifice  Catheter size: 20 G  Catheter at skin depth: 14 cm  Test dose: negativeCatheter Secured: tegaderm and tape  Assessment  Hemodynamics: stable  Attempts: 1  Outcomes: uncomplicated and patient tolerated procedure well  Additional Notes  27g Spinal needle passed through tuohy for confirmation. +CSF  Preanesthetic Checklist  Completed: patient identified, IV checked, site marked, risks and benefits discussed, surgical/procedural consents, equipment checked, pre-op evaluation, timeout performed, anesthesia consent given, oxygen available, monitors applied/VS acknowledged, fire risk safety assessment completed and verbalized and blood product R/B/A discussed and consented

## 2024-03-19 NOTE — H&P
History & Physical    Name: Fernanda Coto MRN: [id number]  SSN: [id number]    YOB: 1985  Age: 38 y.o.  Sex: female        Subjective:     Estimated Date of Delivery: 3/24/24  OB History          3    Para   1    Term   1       0    AB   1    Living   1         SAB   1    IAB   0    Ectopic   0    Molar   0    Multiple   0    Live Births   1                Ms. Coto is admitted with pregnancy at 39w2d for induction of labor. Prenatal course was complicated by chronic hypertension. Please see prenatal records for details.    Complications:   Chronic hypertension- Labetalol 200mg BID, EFW 6lbs 5oz 42%ile at 36 weeks  AMA  GBS pos    Past Medical History:   Diagnosis Date    Gestational hypertension      Past Surgical History:   Procedure Laterality Date    DILATION AND CURETTAGE OF UTERUS       Social History     Occupational History    Not on file   Tobacco Use    Smoking status: Never    Smokeless tobacco: Never   Vaping Use    Vaping Use: Never used   Substance and Sexual Activity    Alcohol use: Not Currently    Drug use: Never    Sexual activity: Not on file     Family History   Problem Relation Age of Onset    High Blood Pressure Mother     Hypertension Mother     Diabetes Maternal Grandmother     Stroke Maternal Grandmother        No Known Allergies  Prior to Admission medications    Medication Sig Start Date End Date Taking? Authorizing Provider   labetalol (NORMODYNE) 200 MG tablet Take 1 tablet by mouth 2 times daily 24   ProviderLuis MD   aspirin 81 MG EC tablet Take 1 tablet by mouth daily    ProviderLuis MD   Prenatal Vit-Fe Fumarate-FA (PRENATAL VITAMINS PO) Take by mouth    ProviderLuis MD   ibuprofen (ADVIL;MOTRIN) 800 MG tablet Take 800 mg by mouth every 8 hours as needed  Patient not taking: Reported on 2023 3/8/21   Automatic Reconciliation, Ar        Review of Systems: Pertinent items are noted in HPI.    Objective:

## 2024-03-19 NOTE — PROGRESS NOTES
0613- Pt arrives to the unit ambulatory and stable for scheduled induction today.     0710- This RN spoke to MD Pastrana and received telephone orders with read back to start pt on pitocin 2mL/hr every 30 minutes and start the GBS penicillin protocol.    0750- MD Zambrano at bedside     1130- Bedside shift change report given to Pamela RN (oncoming nurse) by Hector RN (offgoing nurse). Report included the following information Nurse Handoff Report, Adult Overview, Intake/Output, MAR, and Recent Results.

## 2024-03-20 LAB — T PALLIDUM AB SER QL IA: NON REACTIVE

## 2024-03-20 PROCEDURE — 6370000000 HC RX 637 (ALT 250 FOR IP): Performed by: ADVANCED PRACTICE MIDWIFE

## 2024-03-20 PROCEDURE — 1120000000 HC RM PRIVATE OB

## 2024-03-20 PROCEDURE — 94761 N-INVAS EAR/PLS OXIMETRY MLT: CPT

## 2024-03-20 RX ADMIN — IBUPROFEN 600 MG: 600 TABLET, FILM COATED ORAL at 20:42

## 2024-03-20 RX ADMIN — DOCUSATE SODIUM 100 MG: 100 CAPSULE, LIQUID FILLED ORAL at 08:45

## 2024-03-20 RX ADMIN — ACETAMINOPHEN 650 MG: 325 TABLET ORAL at 18:03

## 2024-03-20 RX ADMIN — IBUPROFEN 600 MG: 600 TABLET, FILM COATED ORAL at 08:45

## 2024-03-20 RX ADMIN — LABETALOL HYDROCHLORIDE 200 MG: 200 TABLET, FILM COATED ORAL at 20:42

## 2024-03-20 RX ADMIN — IBUPROFEN 600 MG: 600 TABLET, FILM COATED ORAL at 01:41

## 2024-03-20 RX ADMIN — DOCUSATE SODIUM 100 MG: 100 CAPSULE, LIQUID FILLED ORAL at 20:42

## 2024-03-20 RX ADMIN — ACETAMINOPHEN 650 MG: 325 TABLET ORAL at 06:03

## 2024-03-20 RX ADMIN — ACETAMINOPHEN 650 MG: 325 TABLET ORAL at 12:12

## 2024-03-20 RX ADMIN — IBUPROFEN 600 MG: 600 TABLET, FILM COATED ORAL at 14:35

## 2024-03-20 ASSESSMENT — PAIN SCALES - GENERAL
PAINLEVEL_OUTOF10: 2
PAINLEVEL_OUTOF10: 4
PAINLEVEL_OUTOF10: 4
PAINLEVEL_OUTOF10: 3
PAINLEVEL_OUTOF10: 2

## 2024-03-20 ASSESSMENT — PAIN DESCRIPTION - DESCRIPTORS
DESCRIPTORS: ACHING;CRAMPING
DESCRIPTORS: ACHING;CRAMPING
DESCRIPTORS: CRAMPING

## 2024-03-20 ASSESSMENT — PAIN DESCRIPTION - LOCATION
LOCATION: ABDOMEN
LOCATION: BACK
LOCATION: ABDOMEN

## 2024-03-20 ASSESSMENT — PAIN DESCRIPTION - ORIENTATION
ORIENTATION: LOWER
ORIENTATION: ANTERIOR;LOWER
ORIENTATION: LOWER

## 2024-03-20 ASSESSMENT — PAIN - FUNCTIONAL ASSESSMENT
PAIN_FUNCTIONAL_ASSESSMENT: ACTIVITIES ARE NOT PREVENTED
PAIN_FUNCTIONAL_ASSESSMENT: ACTIVITIES ARE NOT PREVENTED

## 2024-03-20 NOTE — PROGRESS NOTES
Labor Progress Note     Patient: Fernanda Coto MRN: [id number]  SSN: [id number]    YOB: 1985  Age: 38 y.o.  Sex: female        Subjective:   Patient evaluated and is coping well with contractions . Resting comfortably with epidural   Objective:   Blood pressure (!) 121/59, pulse 100, temperature 97.7 °F (36.5 °C), temperature source Oral, resp. rate 16, last menstrual period 2023, SpO2 99 %.    Sterile Vaginal Exam: deferred  Membranes:  clear   EFM:  -Fetal Heart Rate: reactive                 -Uterine Activity: every 2-4 min   -IUPC: 150-160 MVU  -Pitocin: 20 mu/min        Assessment:    SIUP @ 39w2d  IOL CHTN   GBS +  Category 1 fetal heart rate tracing   Plan:   Continue pitocin titration   Cervical exam 4 hours post adequate   All questions answered, and pt/partner agree with plan of care  Anticipate     Signed By:  HALEY Latham CNM      3/19/2024 9:34 PM

## 2024-03-20 NOTE — ANESTHESIA POSTPROCEDURE EVALUATION
Department of Anesthesiology  Postprocedure Note    Patient: Fernanda Coto  MRN: [id number]  YOB: 1985  Date of evaluation: 3/20/2024    Procedure Summary       Date: 03/19/24 Room / Location:     Anesthesia Start: 1643 Anesthesia Stop: 2250    Procedure: Labor Analgesia Diagnosis:     Scheduled Providers:  Responsible Provider: Sancho eLdbetter MD    Anesthesia Type: epidural ASA Status: 2            Anesthesia Type: No value filed.    Erika Phase I:      Erika Phase II:      Anesthesia Post Evaluation    No notable events documented.

## 2024-03-20 NOTE — DISCHARGE INSTRUCTIONS
Discharge Instructions for  Section    Patient ID:  Fernanda Coto  [id number]  38 y.o.  1985    Continue taking your prenatal vitamins if you are breastfeeding.    Follow-up care is a key part of your treatment and safety. Be sure to keep all your scheduled appointments    Activity  Avoid heavy lifting greater than 10lbs for 2 weeks after your surgery  Pelvic rest for 6 weeks, ie, nothing in your vagina for 6 weeks  (no intercourse, tampons, or douching). No tubs for 6 weeks.  No driving for 2 weeks and until you are no longer taking prescription pain medications and you can put your foot on the break in a hurry    Limit climbing stairs to only when necessary the first 1-2 weeks.  Hold the railing and do not carry your baby up and downstairs at first for safety   You may walk as tolerated, though be care to not over-do it.  Walking will assist in overall healing, decrease constipation and bloating. You'll feel better more quickly with some daily movement.    Diet  Regular diet as tolerated    Wound care  There are several types of incision closures.  If you have metal staples in place when you leave the hospital, please call your doctor’s office to schedule the staple removal as directed at discharge.  If you have steri strips covering your incision, you may remove them as they fall off or be sure to remove them about one week after your surgery.  Removing them in the shower may be easier.  If you have Dermabond, or skin glue, covering your incision, it will fall off slowly in the next few weeks.  You may remove it as it begins to peel off.     Additional wound care  Clear or reddish drainage from your incision is normal.  It's best to leave it open to the air, but if there is drainage, you may cover the incision lightly with gauze, preferably without tape.    Keep the incision clean and dry.    Numbness of the skin at or around your incision is normal and the feeling usually returns gradually.    Call

## 2024-03-20 NOTE — DISCHARGE SUMMARY
Obstetrical Discharge Summary     Name: Fernanda Coto MRN: [id number]  SSN: [id number]    YOB: 1985  Age: 38 y.o.  Sex: female      Allergies: Patient has no known allergies.    Admit Date: 3/19/2024    Discharge Date: 3/21/24     Admitting Physician: Ginger Pastrana DO     Attending Physician:  Ginger Pastrana DO     * Admission Diagnoses: Vaginal delivery [O80]    * Discharge Diagnoses:   Information for the patient's :  Yoseph Coto [[id number]]     Delivery by KASSANDRA Aquino on 3/19/24   Apgars 9, 9   Weight 3360g     Intact perineum       Additional Diagnoses:    Lab Results   Component Value Date/Time    ABORH A POSITIVE 2024 06:50 AM    There is no immunization history for the selected administration types on file for this patient.    * Procedures:  IOL             * Discharge Condition: good    * Hospital Course: Normal hospital course following the delivery.    * Disposition: Home    Discharge Medications:      Medication List        ASK your doctor about these medications      aspirin 81 MG EC tablet     ibuprofen 800 MG tablet  Commonly known as: ADVIL;MOTRIN     labetalol 200 MG tablet  Commonly known as: NORMODYNE     PRENATAL VITAMINS PO              * Follow-up Care/Patient Instructions:  Activity: Activity as tolerated  Diet: Regular Diet  Wound Care: As directed      Ca Silva MD  [facility]

## 2024-03-20 NOTE — L&D DELIVERY NOTE
Patient in semi-fowlers. After approximately once, head delivered OA. No nuchal cord. With next push shoulders easily delivered and baby brought to moms chest. Baby vigorous and crying with drying/stimulation. 3vc clamped and cut when pulsing ceased. Active management of third stage of labor. Placenta delivered spontaneously and intact.  Vulva, vagina, perineum inspected and found to be intact. Fundus firm, midline, small bleeding. Baby remains skin to skin with mom. QBL:  100ml       Yoseph Coto [[id number]]      Labor Events      Cervical Ripening Date/Time:        Rupture Date/Time:  3/19/24 14:30:00   Rupture Type: Intact  Fluid Color: Clear  Fluid Odor: None              Delivery Details      Delivery Date: 3/19/24 Delivery Time: 22:50:00   Delivery Type: Vaginal, Spontaneous               Presentation    Presentation: Vertex  Position: Right  _: Occiput  _: Anterior       Shoulder Dystocia    Shoulder Dystocia Present?: No       Assisted Delivery Details    Forceps Attempted?: No  Vacuum Extractor Attempted?: No                           Cord    Vessels: 3 Vessels  Complications: None  Delayed Cord Clamping?: Yes  Cord Clamped Date/Time: 3/19/2024 22:53:18  Cord Blood Disposition: Discard  Gases Sent?: No              Placenta    Date/Time: 3/19/2024 22:54:49  Removal: Expressed  Appearance: Intact  Disposition: Discarded       Lacerations    Episiotomy: None       Blood Loss  Mother: Fernanda Coto #[id number]     Start of Mother's Information      Delivery Blood Loss  24 1050 - 24 2307      None                 End of Mother's Information  Mother: Fernanda Coto #[id number]                Delivery Providers    Delivering clinician: Sabina Aquino APRN - CNM     Provider Role    Amy Young, LEON Primary Nurse    Alyssia Martinez RN Primary [place] Nurse    Sabina Aquino APRN - CNM Midwife    Roge Noe, RN Nursery Nurse               Assessment    Living

## 2024-03-20 NOTE — PROGRESS NOTES
PostPartum Note    Fernanda Coto  [id number]  1985  38 y.o.    S:  Ms. Fernanda Coto is a 38 y.o.  PPD #1 s/p  @ 39w2d.  Doing well.  She had a baby boy.  Her lochia is like a period.  She describes her pain as mild and is well controlled with PO medications.   She is ambulating and voiding.  Tolerating PO intake.      O:   /71   Pulse 84   Temp 98.4 °F (36.9 °C) (Oral)   Resp 18   LMP 2023 (Exact Date)   SpO2 99%   Breastfeeding Unknown     Lab Results   Component Value Date/Time    WBC 12.4 2024 06:50 AM    HGB 11.2 2024 06:50 AM    HCT 32.2 2024 06:50 AM     2024 06:50 AM    MCV 84.7 2024 06:50 AM       Gen - No acute distress  Abdomen - Fundus firm, below the umbilicus   Ext - Warm, well perfused.  Nontender    A/P:  PPD #1 s/p  @ 39w2d doing well.    1.  Routine PP instructions/ care discussed   2.  Discharge PP2   3.  F/U 4-6 weeks for PP check.      Hallie Ch MD  [facility]

## 2024-03-20 NOTE — LACTATION NOTE
This note was copied from a baby's chart.  Rounding for LC consult.  Mother in bathroom.  Father states this is mother's 2nd child to BF and nursing is going well.  Breastfeeding booklet left at bedside and mother encouraged to call next feed.

## 2024-03-21 VITALS
RESPIRATION RATE: 16 BRPM | DIASTOLIC BLOOD PRESSURE: 77 MMHG | SYSTOLIC BLOOD PRESSURE: 122 MMHG | OXYGEN SATURATION: 100 % | HEART RATE: 80 BPM | TEMPERATURE: 98.2 F

## 2024-03-21 PROCEDURE — 94761 N-INVAS EAR/PLS OXIMETRY MLT: CPT

## 2024-03-21 PROCEDURE — 6370000000 HC RX 637 (ALT 250 FOR IP): Performed by: ADVANCED PRACTICE MIDWIFE

## 2024-03-21 RX ORDER — IBUPROFEN 600 MG/1
600 TABLET ORAL EVERY 6 HOURS PRN
Qty: 40 TABLET | Refills: 0 | Status: SHIPPED | OUTPATIENT
Start: 2024-03-21 | End: 2024-03-31

## 2024-03-21 RX ADMIN — DOCUSATE SODIUM 100 MG: 100 CAPSULE, LIQUID FILLED ORAL at 07:47

## 2024-03-21 RX ADMIN — ACETAMINOPHEN 650 MG: 325 TABLET ORAL at 13:02

## 2024-03-21 RX ADMIN — IBUPROFEN 600 MG: 600 TABLET, FILM COATED ORAL at 13:02

## 2024-03-21 RX ADMIN — ACETAMINOPHEN 650 MG: 325 TABLET ORAL at 00:43

## 2024-03-21 RX ADMIN — LABETALOL HYDROCHLORIDE 200 MG: 200 TABLET, FILM COATED ORAL at 09:27

## 2024-03-21 RX ADMIN — ACETAMINOPHEN 650 MG: 325 TABLET ORAL at 07:47

## 2024-03-21 RX ADMIN — IBUPROFEN 600 MG: 600 TABLET, FILM COATED ORAL at 07:47

## 2024-03-21 ASSESSMENT — PAIN DESCRIPTION - LOCATION
LOCATION: ABDOMEN

## 2024-03-21 ASSESSMENT — PAIN DESCRIPTION - DESCRIPTORS
DESCRIPTORS: SORE
DESCRIPTORS: ACHING;CRAMPING
DESCRIPTORS: CRAMPING

## 2024-03-21 ASSESSMENT — PAIN SCALES - GENERAL
PAINLEVEL_OUTOF10: 2
PAINLEVEL_OUTOF10: 4

## 2024-03-21 ASSESSMENT — PAIN DESCRIPTION - ORIENTATION
ORIENTATION: LOWER

## 2024-03-21 ASSESSMENT — PAIN - FUNCTIONAL ASSESSMENT: PAIN_FUNCTIONAL_ASSESSMENT: ACTIVITIES ARE NOT PREVENTED

## 2024-03-21 NOTE — PROGRESS NOTES
10:40 AM Patient is being discharged today- she has a history of chronic hypertension, and states that she already has her prescription for labetalol 200 mg, twice per day, at home because she took it prior to admission. Patient's prescription for ibuprofen sent to patient's preferred pharmacy in [place]. Postpartum pre-eclampsia signs and symptoms, blood pressure thresholds, and guidelines for when to call her provider/seek emergency medical attention given to patient and she verbalizes understanding and denies any questions at this time. Patient was given a blood pressure cuff to use at home if she develops any signs or symptoms of pre-eclampsia.     Patient discharged to home.  Discharge instructions and education completed and patient reported she had no more questions.  Bands verified on patient and infant, see footprint sheet.  Infant placed in car seat by parent.     Prescriptions:   Ibuprofen

## 2024-03-21 NOTE — LACTATION NOTE
This note was copied from a baby's chart.  Mother and baby for discharge. Mother states baby has been latching on and breastfeeding well. LC reviewed the following:    Discussed with mother her plan for feeding.  Reviewed the benefits of exclusive breast milk feeding during the hospital stay.   Informed her of the risks of using formula to supplement in the first few days of life as well as the benefits of successful breast milk feeding; referred her to the Breastfeeding booklet about this information.   She acknowledges understanding of information reviewed and states that it is her plan to breastfeed her infant.  Will support her choice and offer additional information as needed.       Reviewed breastfeeding basics:  Supply and demand,  stomach size, early  Feeding cues, skin to skin, positioning and baby led latch-on, assymetrical latch with signs of good, deep latch vs shallow, feeding frequency and duration, and log sheet for tracking infant feedings and output.  Breastfeeding Booklet and Warm line information given.  Discussed typical  weight loss and the importance of infant weight checks with pediatrician 1-2 post discharge.       Discussed eating a healthy diet. Instructed mother to eat a variety of foods in order to get a well balanced diet. She should consume an extra 500 calories per day (more than her non-pregnant requirement.) These extra calories will help provide energy needed for optimal breast milk production. Mother also encouraged to \"drink to thirst\" and it is recommended that she drink fluids such as water, fruit/vegetable juice. Nutritious snacks should be available so that she can eat throughout the day to help satisfy her hunger and maintain a good milk supply.       Discussed what to do if she gets engorged or if her nipples become sore:    Engorgement Care Guidelines:  Reviewed how milk is made and normal phases of milk production.  Taught care of engorged breasts -

## 2024-03-21 NOTE — PROGRESS NOTES
PostPartum Note    Fernanda Coto  [id number]  1985  38 y.o.    S:  Ms. Fernanda Coto is a 38 y.o.  PPD #2 s/p  @ 39w2d.  Doing well.  She had a baby boy.  Her lochia is like a period.  She describes her pain as mild and is well controlled with PO medications.   She is ambulating and voiding.  Tolerating PO intake.      O:   /77   Pulse 80   Temp 98.2 °F (36.8 °C) (Oral)   Resp 16   LMP 2023 (Exact Date)   SpO2 100%   Breastfeeding Unknown     Lab Results   Component Value Date/Time    WBC 12.4 2024 06:50 AM    HGB 11.2 2024 06:50 AM    HCT 32.2 2024 06:50 AM     2024 06:50 AM    MCV 84.7 2024 06:50 AM       Gen - No acute distress  Abdomen - Fundus firm, below the umbilicus   Ext - Warm, well perfused.  Nontender    A/P:  PPD #2 s/p  @ 39w2d doing well.    1.  Routine PP instructions/ care discussed   2.  CHTN BP well controlled, continue labetalol 200mg po BID   3. Discharge home today   4. Circumcision: done   5. F/U 4-6 weeks for PP check.      Ginger Pastrana,   [facility]

## 2024-08-02 ENCOUNTER — TRANSCRIBE ORDERS (OUTPATIENT)
Facility: HOSPITAL | Age: 39
End: 2024-08-02

## 2024-08-02 ENCOUNTER — HOSPITAL ENCOUNTER (OUTPATIENT)
Facility: HOSPITAL | Age: 39
Discharge: HOME | End: 2024-08-02
Payer: COMMERCIAL

## 2024-08-02 DIAGNOSIS — R10.2 PELVIC PAIN SYNDROME: Primary | ICD-10-CM

## 2024-08-02 DIAGNOSIS — R10.2 PELVIC PAIN SYNDROME: ICD-10-CM

## 2024-08-02 PROCEDURE — 72170 X-RAY EXAM OF PELVIS: CPT

## (undated) DEVICE — STERILE POLYISOPRENE POWDER-FREE SURGICAL GLOVES: Brand: PROTEXIS

## (undated) DEVICE — HANDLE SUCT TBNG L6FR DIA3/8IN SWVL W/ M ADPT FOR BERK PMP

## (undated) DEVICE — TOWEL SURG W17XL27IN STD BLU COT NONFENESTRATED PREWASHED

## (undated) DEVICE — MARKER,SKIN,WI/RULER AND LABELS: Brand: MEDLINE

## (undated) DEVICE — PAD,SANITARY,11 IN,MAXI,N-STRL,IND WRAP: Brand: MEDLINE

## (undated) DEVICE — STRAP,POSITIONING,KNEE/BODY,FOAM,4X60": Brand: MEDLINE

## (undated) DEVICE — GRADUATED BOWL: Brand: DEVON

## (undated) DEVICE — PACK,LITHOTOMY,PK I: Brand: MEDLINE

## (undated) DEVICE — SHEET,DRAPE,UNDERBUTTOCK,GRAD POUCH,PORT: Brand: MEDLINE

## (undated) DEVICE — SYR 10ML LUER LOK 1/5ML GRAD --

## (undated) DEVICE — TUBING SET BERK 6FT LF DISP -- GYRUS

## (undated) DEVICE — SOL IRR SOD CL 0.9% 1000ML BTL --

## (undated) DEVICE — SPONGE GZ W4XL4IN COT RADPQ HIGHLY ABSRB

## (undated) DEVICE — CATHETER,URETHRAL,REDRUBBER,STRL,16FR: Brand: MEDLINE

## (undated) DEVICE — INFECTION CONTROL KIT SYS

## (undated) DEVICE — PAD,NON-ADHERENT,3X8,STERILE,LF,1/PK: Brand: MEDLINE

## (undated) DEVICE — TRAY PREP DRY W/ PREM GLV 2 APPL 6 SPNG 2 UNDPD 1 OVERWRAP

## (undated) DEVICE — COVER LT HNDL PLAS RIG 1 PER PK

## (undated) DEVICE — NEEDLE SPNL 22GA L3.5IN BLK HUB S STL REG WALL FIT STYL W/

## (undated) DEVICE — COLLECTION KT SUC TISS BERK -- GYRUS

## (undated) DEVICE — CURETTE VAC DIA9MM PLAS CRV OPN TIP RIG DISP VACURET D/E

## (undated) DEVICE — JELLY,LUBE,STERILE,FLIP TOP,TUBE,4-OZ: Brand: MEDLINE